# Patient Record
Sex: FEMALE | Race: WHITE | NOT HISPANIC OR LATINO
[De-identification: names, ages, dates, MRNs, and addresses within clinical notes are randomized per-mention and may not be internally consistent; named-entity substitution may affect disease eponyms.]

---

## 2017-10-29 ENCOUNTER — TRANSCRIPTION ENCOUNTER (OUTPATIENT)
Age: 82
End: 2017-10-29

## 2017-11-08 ENCOUNTER — TRANSCRIPTION ENCOUNTER (OUTPATIENT)
Age: 82
End: 2017-11-08

## 2019-07-08 ENCOUNTER — APPOINTMENT (OUTPATIENT)
Dept: CARDIOLOGY | Facility: CLINIC | Age: 84
End: 2019-07-08

## 2019-07-08 ENCOUNTER — OUTPATIENT (OUTPATIENT)
Dept: OUTPATIENT SERVICES | Facility: HOSPITAL | Age: 84
LOS: 1 days | End: 2019-07-08
Payer: MEDICARE

## 2019-07-08 DIAGNOSIS — Z90.89 ACQUIRED ABSENCE OF OTHER ORGANS: Chronic | ICD-10-CM

## 2019-07-08 DIAGNOSIS — R07.9 CHEST PAIN, UNSPECIFIED: ICD-10-CM

## 2019-07-08 PROCEDURE — 75571 CT HRT W/O DYE W/CA TEST: CPT | Mod: 26

## 2019-07-08 PROCEDURE — 75571 CT HRT W/O DYE W/CA TEST: CPT

## 2019-08-19 ENCOUNTER — APPOINTMENT (OUTPATIENT)
Dept: MRI IMAGING | Facility: IMAGING CENTER | Age: 84
End: 2019-08-19
Payer: MEDICARE

## 2019-08-19 ENCOUNTER — OUTPATIENT (OUTPATIENT)
Dept: OUTPATIENT SERVICES | Facility: HOSPITAL | Age: 84
LOS: 1 days | End: 2019-08-19
Payer: MEDICARE

## 2019-08-19 DIAGNOSIS — Z00.8 ENCOUNTER FOR OTHER GENERAL EXAMINATION: ICD-10-CM

## 2019-08-19 DIAGNOSIS — Z90.89 ACQUIRED ABSENCE OF OTHER ORGANS: Chronic | ICD-10-CM

## 2019-08-19 PROCEDURE — 70551 MRI BRAIN STEM W/O DYE: CPT | Mod: 26

## 2019-08-19 PROCEDURE — 70551 MRI BRAIN STEM W/O DYE: CPT

## 2023-12-06 ENCOUNTER — INPATIENT (INPATIENT)
Facility: HOSPITAL | Age: 88
LOS: 3 days | Discharge: NURSING, REHAB OR LONG-TERM CARE | DRG: 149 | End: 2023-12-10
Attending: STUDENT IN AN ORGANIZED HEALTH CARE EDUCATION/TRAINING PROGRAM | Admitting: HOSPITALIST
Payer: MEDICARE

## 2023-12-06 VITALS
RESPIRATION RATE: 17 BRPM | WEIGHT: 100.09 LBS | HEART RATE: 64 BPM | SYSTOLIC BLOOD PRESSURE: 121 MMHG | HEIGHT: 62 IN | TEMPERATURE: 98 F | OXYGEN SATURATION: 95 % | DIASTOLIC BLOOD PRESSURE: 58 MMHG

## 2023-12-06 DIAGNOSIS — S22.39XA FRACTURE OF ONE RIB, UNSPECIFIED SIDE, INITIAL ENCOUNTER FOR CLOSED FRACTURE: ICD-10-CM

## 2023-12-06 DIAGNOSIS — R42 DIZZINESS AND GIDDINESS: ICD-10-CM

## 2023-12-06 DIAGNOSIS — Z86.69 PERSONAL HISTORY OF OTHER DISEASES OF THE NERVOUS SYSTEM AND SENSE ORGANS: ICD-10-CM

## 2023-12-06 DIAGNOSIS — Z29.9 ENCOUNTER FOR PROPHYLACTIC MEASURES, UNSPECIFIED: ICD-10-CM

## 2023-12-06 DIAGNOSIS — Z90.89 ACQUIRED ABSENCE OF OTHER ORGANS: Chronic | ICD-10-CM

## 2023-12-06 LAB
ALBUMIN SERPL ELPH-MCNC: 4.2 G/DL — SIGNIFICANT CHANGE UP (ref 3.3–5)
ALBUMIN SERPL ELPH-MCNC: 4.2 G/DL — SIGNIFICANT CHANGE UP (ref 3.3–5)
ALP SERPL-CCNC: 80 U/L — SIGNIFICANT CHANGE UP (ref 40–120)
ALP SERPL-CCNC: 80 U/L — SIGNIFICANT CHANGE UP (ref 40–120)
ALT FLD-CCNC: 23 U/L — SIGNIFICANT CHANGE UP (ref 10–45)
ALT FLD-CCNC: 23 U/L — SIGNIFICANT CHANGE UP (ref 10–45)
ANION GAP SERPL CALC-SCNC: 11 MMOL/L — SIGNIFICANT CHANGE UP (ref 5–17)
ANION GAP SERPL CALC-SCNC: 11 MMOL/L — SIGNIFICANT CHANGE UP (ref 5–17)
AST SERPL-CCNC: 43 U/L — HIGH (ref 10–40)
AST SERPL-CCNC: 43 U/L — HIGH (ref 10–40)
BASOPHILS # BLD AUTO: 0 K/UL — SIGNIFICANT CHANGE UP (ref 0–0.2)
BASOPHILS # BLD AUTO: 0 K/UL — SIGNIFICANT CHANGE UP (ref 0–0.2)
BASOPHILS NFR BLD AUTO: 0 % — SIGNIFICANT CHANGE UP (ref 0–2)
BASOPHILS NFR BLD AUTO: 0 % — SIGNIFICANT CHANGE UP (ref 0–2)
BILIRUB SERPL-MCNC: 0.4 MG/DL — SIGNIFICANT CHANGE UP (ref 0.2–1.2)
BILIRUB SERPL-MCNC: 0.4 MG/DL — SIGNIFICANT CHANGE UP (ref 0.2–1.2)
BUN SERPL-MCNC: 23 MG/DL — SIGNIFICANT CHANGE UP (ref 7–23)
BUN SERPL-MCNC: 23 MG/DL — SIGNIFICANT CHANGE UP (ref 7–23)
CALCIUM SERPL-MCNC: 9.1 MG/DL — SIGNIFICANT CHANGE UP (ref 8.4–10.5)
CALCIUM SERPL-MCNC: 9.1 MG/DL — SIGNIFICANT CHANGE UP (ref 8.4–10.5)
CHLORIDE SERPL-SCNC: 95 MMOL/L — LOW (ref 96–108)
CHLORIDE SERPL-SCNC: 95 MMOL/L — LOW (ref 96–108)
CO2 SERPL-SCNC: 26 MMOL/L — SIGNIFICANT CHANGE UP (ref 22–31)
CO2 SERPL-SCNC: 26 MMOL/L — SIGNIFICANT CHANGE UP (ref 22–31)
CREAT SERPL-MCNC: 0.59 MG/DL — SIGNIFICANT CHANGE UP (ref 0.5–1.3)
CREAT SERPL-MCNC: 0.59 MG/DL — SIGNIFICANT CHANGE UP (ref 0.5–1.3)
EGFR: 85 ML/MIN/1.73M2 — SIGNIFICANT CHANGE UP
EGFR: 85 ML/MIN/1.73M2 — SIGNIFICANT CHANGE UP
EOSINOPHIL # BLD AUTO: 0 K/UL — SIGNIFICANT CHANGE UP (ref 0–0.5)
EOSINOPHIL # BLD AUTO: 0 K/UL — SIGNIFICANT CHANGE UP (ref 0–0.5)
EOSINOPHIL NFR BLD AUTO: 0 % — SIGNIFICANT CHANGE UP (ref 0–6)
EOSINOPHIL NFR BLD AUTO: 0 % — SIGNIFICANT CHANGE UP (ref 0–6)
GLUCOSE SERPL-MCNC: 100 MG/DL — HIGH (ref 70–99)
GLUCOSE SERPL-MCNC: 100 MG/DL — HIGH (ref 70–99)
HCT VFR BLD CALC: 35.7 % — SIGNIFICANT CHANGE UP (ref 34.5–45)
HCT VFR BLD CALC: 35.7 % — SIGNIFICANT CHANGE UP (ref 34.5–45)
HGB BLD-MCNC: 12.1 G/DL — SIGNIFICANT CHANGE UP (ref 11.5–15.5)
HGB BLD-MCNC: 12.1 G/DL — SIGNIFICANT CHANGE UP (ref 11.5–15.5)
LYMPHOCYTES # BLD AUTO: 0.69 K/UL — LOW (ref 1–3.3)
LYMPHOCYTES # BLD AUTO: 0.69 K/UL — LOW (ref 1–3.3)
LYMPHOCYTES # BLD AUTO: 10.7 % — LOW (ref 13–44)
LYMPHOCYTES # BLD AUTO: 10.7 % — LOW (ref 13–44)
MANUAL SMEAR VERIFICATION: SIGNIFICANT CHANGE UP
MANUAL SMEAR VERIFICATION: SIGNIFICANT CHANGE UP
MCHC RBC-ENTMCNC: 31.1 PG — SIGNIFICANT CHANGE UP (ref 27–34)
MCHC RBC-ENTMCNC: 31.1 PG — SIGNIFICANT CHANGE UP (ref 27–34)
MCHC RBC-ENTMCNC: 33.9 GM/DL — SIGNIFICANT CHANGE UP (ref 32–36)
MCHC RBC-ENTMCNC: 33.9 GM/DL — SIGNIFICANT CHANGE UP (ref 32–36)
MCV RBC AUTO: 91.8 FL — SIGNIFICANT CHANGE UP (ref 80–100)
MCV RBC AUTO: 91.8 FL — SIGNIFICANT CHANGE UP (ref 80–100)
MONOCYTES # BLD AUTO: 0.57 K/UL — SIGNIFICANT CHANGE UP (ref 0–0.9)
MONOCYTES # BLD AUTO: 0.57 K/UL — SIGNIFICANT CHANGE UP (ref 0–0.9)
MONOCYTES NFR BLD AUTO: 8.9 % — SIGNIFICANT CHANGE UP (ref 2–14)
MONOCYTES NFR BLD AUTO: 8.9 % — SIGNIFICANT CHANGE UP (ref 2–14)
NEUTROPHILS # BLD AUTO: 5.15 K/UL — SIGNIFICANT CHANGE UP (ref 1.8–7.4)
NEUTROPHILS # BLD AUTO: 5.15 K/UL — SIGNIFICANT CHANGE UP (ref 1.8–7.4)
NEUTROPHILS NFR BLD AUTO: 75.9 % — SIGNIFICANT CHANGE UP (ref 43–77)
NEUTROPHILS NFR BLD AUTO: 75.9 % — SIGNIFICANT CHANGE UP (ref 43–77)
NEUTS BAND # BLD: 4.5 % — SIGNIFICANT CHANGE UP (ref 0–8)
NEUTS BAND # BLD: 4.5 % — SIGNIFICANT CHANGE UP (ref 0–8)
PLAT MORPH BLD: NORMAL — SIGNIFICANT CHANGE UP
PLAT MORPH BLD: NORMAL — SIGNIFICANT CHANGE UP
PLATELET # BLD AUTO: 235 K/UL — SIGNIFICANT CHANGE UP (ref 150–400)
PLATELET # BLD AUTO: 235 K/UL — SIGNIFICANT CHANGE UP (ref 150–400)
POTASSIUM SERPL-MCNC: 4 MMOL/L — SIGNIFICANT CHANGE UP (ref 3.5–5.3)
POTASSIUM SERPL-MCNC: 4 MMOL/L — SIGNIFICANT CHANGE UP (ref 3.5–5.3)
POTASSIUM SERPL-SCNC: 4 MMOL/L — SIGNIFICANT CHANGE UP (ref 3.5–5.3)
POTASSIUM SERPL-SCNC: 4 MMOL/L — SIGNIFICANT CHANGE UP (ref 3.5–5.3)
PROT SERPL-MCNC: 6.6 G/DL — SIGNIFICANT CHANGE UP (ref 6–8.3)
PROT SERPL-MCNC: 6.6 G/DL — SIGNIFICANT CHANGE UP (ref 6–8.3)
RBC # BLD: 3.89 M/UL — SIGNIFICANT CHANGE UP (ref 3.8–5.2)
RBC # BLD: 3.89 M/UL — SIGNIFICANT CHANGE UP (ref 3.8–5.2)
RBC # FLD: 12.5 % — SIGNIFICANT CHANGE UP (ref 10.3–14.5)
RBC # FLD: 12.5 % — SIGNIFICANT CHANGE UP (ref 10.3–14.5)
RBC BLD AUTO: SIGNIFICANT CHANGE UP
RBC BLD AUTO: SIGNIFICANT CHANGE UP
SODIUM SERPL-SCNC: 132 MMOL/L — LOW (ref 135–145)
SODIUM SERPL-SCNC: 132 MMOL/L — LOW (ref 135–145)
WBC # BLD: 6.41 K/UL — SIGNIFICANT CHANGE UP (ref 3.8–10.5)
WBC # BLD: 6.41 K/UL — SIGNIFICANT CHANGE UP (ref 3.8–10.5)
WBC # FLD AUTO: 6.41 K/UL — SIGNIFICANT CHANGE UP (ref 3.8–10.5)
WBC # FLD AUTO: 6.41 K/UL — SIGNIFICANT CHANGE UP (ref 3.8–10.5)

## 2023-12-06 PROCEDURE — 99285 EMERGENCY DEPT VISIT HI MDM: CPT | Mod: FS

## 2023-12-06 PROCEDURE — 70486 CT MAXILLOFACIAL W/O DYE: CPT | Mod: 26,MA

## 2023-12-06 PROCEDURE — 71250 CT THORAX DX C-: CPT | Mod: 26,MA

## 2023-12-06 PROCEDURE — 74176 CT ABD & PELVIS W/O CONTRAST: CPT | Mod: 26,MA

## 2023-12-06 PROCEDURE — 73502 X-RAY EXAM HIP UNI 2-3 VIEWS: CPT | Mod: 26,RT

## 2023-12-06 PROCEDURE — 93010 ELECTROCARDIOGRAM REPORT: CPT

## 2023-12-06 PROCEDURE — 99223 1ST HOSP IP/OBS HIGH 75: CPT

## 2023-12-06 PROCEDURE — 70450 CT HEAD/BRAIN W/O DYE: CPT | Mod: 26,MA

## 2023-12-06 PROCEDURE — 72170 X-RAY EXAM OF PELVIS: CPT | Mod: 26,59

## 2023-12-06 PROCEDURE — 76376 3D RENDER W/INTRP POSTPROCES: CPT | Mod: 26

## 2023-12-06 PROCEDURE — 72125 CT NECK SPINE W/O DYE: CPT | Mod: 26,MA

## 2023-12-06 RX ORDER — CARBIDOPA AND LEVODOPA 25; 100 MG/1; MG/1
1 TABLET ORAL THREE TIMES A DAY
Refills: 0 | Status: DISCONTINUED | OUTPATIENT
Start: 2023-12-06 | End: 2023-12-10

## 2023-12-06 RX ORDER — MECLIZINE HCL 12.5 MG
12.5 TABLET ORAL THREE TIMES A DAY
Refills: 0 | Status: DISCONTINUED | OUTPATIENT
Start: 2023-12-06 | End: 2023-12-09

## 2023-12-06 RX ORDER — ACETAMINOPHEN 500 MG
975 TABLET ORAL ONCE
Refills: 0 | Status: COMPLETED | OUTPATIENT
Start: 2023-12-06 | End: 2023-12-06

## 2023-12-06 RX ORDER — LANOLIN ALCOHOL/MO/W.PET/CERES
3 CREAM (GRAM) TOPICAL AT BEDTIME
Refills: 0 | Status: DISCONTINUED | OUTPATIENT
Start: 2023-12-06 | End: 2023-12-10

## 2023-12-06 RX ORDER — ENOXAPARIN SODIUM 100 MG/ML
40 INJECTION SUBCUTANEOUS EVERY 24 HOURS
Refills: 0 | Status: DISCONTINUED | OUTPATIENT
Start: 2023-12-06 | End: 2023-12-10

## 2023-12-06 RX ORDER — OXYCODONE HYDROCHLORIDE 5 MG/1
5 TABLET ORAL ONCE
Refills: 0 | Status: DISCONTINUED | OUTPATIENT
Start: 2023-12-06 | End: 2023-12-06

## 2023-12-06 RX ORDER — TETANUS TOXOID, REDUCED DIPHTHERIA TOXOID AND ACELLULAR PERTUSSIS VACCINE, ADSORBED 5; 2.5; 8; 8; 2.5 [IU]/.5ML; [IU]/.5ML; UG/.5ML; UG/.5ML; UG/.5ML
0.5 SUSPENSION INTRAMUSCULAR ONCE
Refills: 0 | Status: COMPLETED | OUTPATIENT
Start: 2023-12-06 | End: 2023-12-06

## 2023-12-06 RX ORDER — ACETAMINOPHEN 500 MG
650 TABLET ORAL EVERY 6 HOURS
Refills: 0 | Status: DISCONTINUED | OUTPATIENT
Start: 2023-12-06 | End: 2023-12-10

## 2023-12-06 RX ORDER — ONDANSETRON 8 MG/1
4 TABLET, FILM COATED ORAL EVERY 8 HOURS
Refills: 0 | Status: DISCONTINUED | OUTPATIENT
Start: 2023-12-06 | End: 2023-12-10

## 2023-12-06 RX ADMIN — Medication 975 MILLIGRAM(S): at 12:05

## 2023-12-06 RX ADMIN — ENOXAPARIN SODIUM 40 MILLIGRAM(S): 100 INJECTION SUBCUTANEOUS at 22:47

## 2023-12-06 RX ADMIN — CARBIDOPA AND LEVODOPA 1 TABLET(S): 25; 100 TABLET ORAL at 22:47

## 2023-12-06 RX ADMIN — TETANUS TOXOID, REDUCED DIPHTHERIA TOXOID AND ACELLULAR PERTUSSIS VACCINE, ADSORBED 0.5 MILLILITER(S): 5; 2.5; 8; 8; 2.5 SUSPENSION INTRAMUSCULAR at 12:06

## 2023-12-06 RX ADMIN — Medication 12.5 MILLIGRAM(S): at 22:47

## 2023-12-06 NOTE — PATIENT PROFILE ADULT - FALL HARM RISK - HARM RISK INTERVENTIONS
Assistance with ambulation/Assistance OOB with selected safe patient handling equipment/Communicate Risk of Fall with Harm to all staff/Reinforce activity limits and safety measures with patient and family/Tailored Fall Risk Interventions/Visual Cue: Yellow wristband and red socks/Bed in lowest position, wheels locked, appropriate side rails in place/Call bell, personal items and telephone in reach/Instruct patient to call for assistance before getting out of bed or chair/Non-slip footwear when patient is out of bed/[place] to call system/Physically safe environment - no spills, clutter or unnecessary equipment/Purposeful Proactive Rounding/Room/bathroom lighting operational, light cord in reach Assistance with ambulation/Assistance OOB with selected safe patient handling equipment/Communicate Risk of Fall with Harm to all staff/Reinforce activity limits and safety measures with patient and family/Tailored Fall Risk Interventions/Visual Cue: Yellow wristband and red socks/Bed in lowest position, wheels locked, appropriate side rails in place/Call bell, personal items and telephone in reach/Instruct patient to call for assistance before getting out of bed or chair/Non-slip footwear when patient is out of bed/[place] to call system/Physically safe environment - no spills, clutter or unnecessary equipment/Purposeful Proactive Rounding/Room/bathroom lighting operational, light cord in reach

## 2023-12-06 NOTE — ED PROVIDER NOTE - PHYSICAL EXAMINATION
Attending note.  Patient is alert and only mild distress.  Patient has tenderness swelling and bruising of the left cheek and nose with deformity.  There is dried blood in the left nostril.  There is no septal hematoma.  Patient has tenderness and the right lower paracervical area midline.  Lungs are clear and equal bilaterally.  Patient has tenderness in the right lateral ribs without crepitance.  Heart is regular rate and rhythm.  Abdomen is soft, nontender nondistended.  Pelvis is stable and nontender.  Patient has pain in bilateral groin with attempted straight leg raise.  She has full range of motion in the upper extremity and both knees.  She has mild cogwheel rigidity.  Otherwise neurological examination is grossly intact.

## 2023-12-06 NOTE — H&P ADULT - NSHPREVIEWOFSYSTEMS_GEN_ALL_CORE
Review of Systems:   CONSTITUTIONAL: No fever, weight loss  EYES: No eye pain, visual disturbances, or discharge  ENMT:  No difficulty hearing, tinnitus, vertigo; No sinus or throat pain  RESPIRATORY: No SOB. No cough, wheezing, chills or hemoptysis  CARDIOVASCULAR: No chest pain, palpitations, dizziness, or leg swelling  GASTROINTESTINAL: No abdominal or epigastric pain. No nausea, vomiting, or hematemesis; No diarrhea or constipation. No melena or hematochezia.  GENITOURINARY: No dysuria, frequency, hematuria, or incontinence  NEUROLOGICAL: No headaches, memory loss, loss of strength, numbness, or tremors  SKIN: No itching, burning, rashes, or lesions   LYMPH NODES: No enlarged glands  ENDOCRINE: No heat or cold intolerance; No hair loss  MUSCULOSKELETAL: No joint pain or swelling  PSYCHIATRIC: No depression, anxiety, mood swings, or difficulty sleeping  HEME/LYMPH: No easy bruising, or bleeding gums

## 2023-12-06 NOTE — PATIENT PROFILE ADULT - LOCATION #2
Writer sent prescription to the desired pharmacy and confirmed with patient that this was completed, no call back needed.   
patient is on vacation, forgot her Latanoprost at home, she is asking if we can call in a one month supply to the local [facility] pharmacy on [address], she will have them transfer the RX to where she is at.  
Right heel

## 2023-12-06 NOTE — ED ADULT NURSE NOTE - OBJECTIVE STATEMENT
Patient BIBEMS c/o R calf pain s/p fall out of bed yesterday. Patient hit her head, denies LOC, has abrasion to nose. Patient ambulatory at baseline with cane. Patient A&Ox4. Denies sob, chest pain, headache, dizziness. No signs of acute distress at this time. Plan of care in progress. Patient BIBEMS c/o R calf pain s/p fall out of bed yesterday. Patient hit her head, denies LOC, has abrasion to nose and L cheek. Patient ambulatory at baseline but is c/o dizziness x 4 days. Patient also reports having fall 3 days ago. Patient A&Ox4. Denies sob, chest pain, headache. No signs of acute distress at this time. Plan of care in progress.

## 2023-12-06 NOTE — PATIENT PROFILE ADULT - FUNCTIONAL ASSESSMENT - BASIC MOBILITY 6.
2-calculated by average/Not able to assess (calculate score using [facility] averaging method)  2-calculated by average/Not able to assess (calculate score using [facility] averaging method)

## 2023-12-06 NOTE — H&P ADULT - NSHPPHYSICALEXAM_GEN_ALL_CORE
PHYSICAL EXAM:  Vital Signs Last 24 Hrs  T(C): 36.7 (06 Dec 2023 09:57), Max: 36.7 (06 Dec 2023 09:57)  T(F): 98 (06 Dec 2023 09:57), Max: 98 (06 Dec 2023 09:57)  HR: 64 (06 Dec 2023 09:57) (64 - 64)  BP: 121/58 (06 Dec 2023 09:57) (121/58 - 121/58)  BP(mean): --  RR: 17 (06 Dec 2023 09:57) (17 - 17)  SpO2: 95% (06 Dec 2023 09:57) (95% - 95%)      CONSTITUTIONAL: NAD, thin, L facial ecchymosis + swelling  EYES: PERRL; conjunctiva and sclera clear  ENMT: Moist oral mucosa  RESPIRATORY: Normal respiratory effort; lungs are clear to auscultation bilaterally  CARDIOVASCULAR: Regular rate and rhythm, normal S1 and S2; No lower extremity edema  ABDOMEN: Nontender to palpation, normoactive bowel sounds, no rebound/guarding  MUSCULOSKELETAL: no clubbing or cyanosis of digits; no joint swelling or tenderness to palpation  PSYCH: A+O to person, month. Thinks that she is at home and the year is 1990s; calm  NEUROLOGY: moving all extremities; no gross sensory deficits   SKIN: No rashes; no palpable lesions

## 2023-12-06 NOTE — ED PROVIDER NOTE - CLINICAL SUMMARY MEDICAL DECISION MAKING FREE TEXT BOX
Attending note.  See differential above.  Patient with Parkinson's who fell 4 days ago and again yesterday with clinically fracture of nose and tenderness and pain to left cheek, right lateral ribs, groin bilaterally.  Concern for nasal fracture, possible rib fracture, pelvic or hip fracture.  Trauma pan scan with CT, hip x-ray, labs, urinalysis, EKG.  Likely admission.

## 2023-12-06 NOTE — H&P ADULT - NSHPLABSRESULTS_GEN_ALL_CORE
LABS:                        12.1   6.41  )-----------( 235      ( 06 Dec 2023 12:06 )             35.7     12-06    132<L>  |  95<L>  |  23  ----------------------------<  100<H>  4.0   |  26  |  0.59    Ca    9.1      06 Dec 2023 12:06    TPro  6.6  /  Alb  4.2  /  TBili  0.4  /  DBili  x   /  AST  43<H>  /  ALT  23  /  AlkPhos  80  12-06          Urinalysis Basic - ( 06 Dec 2023 12:06 )    Color: x / Appearance: x / SG: x / pH: x  Gluc: 100 mg/dL / Ketone: x  / Bili: x / Urobili: x   Blood: x / Protein: x / Nitrite: x   Leuk Esterase: x / RBC: x / WBC x   Sq Epi: x / Non Sq Epi: x / Bacteria: x          RADIOLOGY & ADDITIONAL TESTS:  Results Reviewed:   Imaging Personally Reviewed:  Electrocardiogram Personally Reviewed: NSR, has left bundle (seen on 2016)    COORDINATION OF CARE:  Care Discussed with Consultants/Other Providers [Y/N]:  Prior or Outpatient Records Reviewed [Y/N]:

## 2023-12-06 NOTE — H&P ADULT - PROBLEM SELECTOR PLAN 1
CT chest with acute right lateral 10th rib fracture  Pain control with tylenol. Can add oxy 2.5mg PRN if needed    CTH, neck, maxillofacial with no fx, just left periorbital and premaxillary soft tissue swelling.

## 2023-12-06 NOTE — ED ADULT NURSE NOTE - NSFALLHARMRISKINTERV_ED_ALL_ED
Communicate risk of Fall with Harm to all staff, patient, and family/Provide visual cue: red socks, yellow wristband, yellow gown, etc/Reinforce activity limits and safety measures with patient and family/Bed in lowest position, wheels locked, appropriate side rails in place/Call bell, personal items and telephone in reach/Instruct patient to call for assistance before getting out of bed/chair/stretcher/Non-slip footwear applied when patient is off stretcher/[place] to call system/Physically safe environment - no spills, clutter or unnecessary equipment/Purposeful Proactive Rounding/Room/bathroom lighting operational, light cord in reach Communicate risk of Fall with Harm to all staff, patient, and family/Provide visual cue: red socks, yellow wristband, yellow gown, etc/Reinforce activity limits and safety measures with patient and family/Bed in lowest position, wheels locked, appropriate side rails in place/Call bell, personal items and telephone in reach/Instruct patient to call for assistance before getting out of bed/chair/stretcher/Non-slip footwear applied when patient is off stretcher/[place] to call system/Physically safe environment - no spills, clutter or unnecessary equipment/Purposeful Proactive Rounding/Room/bathroom lighting operational, light cord in reach

## 2023-12-06 NOTE — H&P ADULT - PROBLEM SELECTOR PLAN 2
On carbidopa-levodopa  Appears to be falling more frequently at home- says 2/2 dizziness  check orthostatics  Unlikely cardiac etiology for fall- LBBB present in 2016, no CP. No need for TTE at this time On carbidopa-levodopa  Appears to be falling more frequently at home- says 2/2 dizziness  check orthostatics  Unlikely cardiac etiology for fall- LBBB present in 2016, no CP. No need for TTE at this time  Patient currently oriented only to self + month. Unclear baseline, cannot reach . UA pending

## 2023-12-06 NOTE — ED PROVIDER NOTE - DIFFERENTIAL DIAGNOSIS
Differential not limited to nasal fracture, facial fracture, intracranial hemorrhage, cervical fracture, rib fracture, pelvic fracture Differential Diagnosis

## 2023-12-06 NOTE — H&P ADULT - ASSESSMENT
This is a 89 yo F [facility] Parkinson's and vertigo who presented to the ER for falls. Endorsing some difficulty ambulating due to dizziness.  This is a 89 yo F [facility] Parkinson's and vertigo who presented to the ER for falls. Endorsing some difficulty ambulating due to dizziness.

## 2023-12-06 NOTE — ED ADULT NURSE NOTE - BEFAST SPEECH SLURRED
AK    Caller: Oscar Whipple    Medication Name and Dosage:    rosuvastatin (CRESTOR) 40 MG tablet [[id number]]    metoprolol SR (TOPROL XL) 25 MG TABLET SR 24 HR [[id number]]      Medication amount left:  3    [facility]    Other questions (Topic): Requesting a 90 day supply    Callback Number (Will only call for issues): N/A    Thank you,   Alejandrina LOPEZ   
No

## 2023-12-06 NOTE — H&P ADULT - HISTORY OF PRESENT ILLNESS
This is a 89 yo F [facility] Parkinson's and vertigo who presented to the ER for falls. Reportedly fell 4 days ago, also fell yesterday AM and hit the left side of her face. Patient was having some R rib pain earlier but says it resolved, not endorsing any other pain. Lives with her .     Patient currently thinks she is at home, and says she is coming to the hospital tomorrow. Endorses living with her . Tried  x2, no answer for collateral.     In the ER, HD stable. Given tylenol + oxy. Imaging with no fractures.

## 2023-12-06 NOTE — ED ADULT NURSE REASSESSMENT NOTE - NS ED NURSE REASSESS COMMENT FT1
Received report from RAISSA Mejias RN. Patient admitted to medicine, pending bed at this time. AOx2, disoriented to time. Pt placed in position of comfort. Bed in lowest position, wheels locked, appropriate side rails raised.

## 2023-12-06 NOTE — ED PROVIDER NOTE - OBJECTIVE STATEMENT
Attending note.  Patient was seen in room #34 to the right.  Patient was brought in by EMS.  Patient has a history of Parkinson's and fell 4 days ago when she lost her balance.  Patient also reports falling yesterday morning off of the bed.  Patient struck the left side of her face and nose.  Had a brief nosebleed.  She also complaining of pain in the right groin as well as right lateral ribs.  She also reports some neck pain.  She has no numbness or paresthesia.  She denies any recent change in her health with no recent weight loss, or symptoms consistent with infection or ischemia.  She has a history of vertigo for which she takes meclizine and taking medication for her Parkinson's.  She never smoked.  She reports no allergies.  She reports not having no PCP at this time.  She lives alone at home with her elderly .

## 2023-12-06 NOTE — PATIENT PROFILE ADULT - NSPROGENPREVTRANSF_GEN_A_NUR
FYI. Patient  Had lab appointment  Today (01/04). Patients weight at time of appointment  Was 101.7lbs   no history of blood product transfusion

## 2023-12-07 LAB
ANION GAP SERPL CALC-SCNC: 10 MMOL/L — SIGNIFICANT CHANGE UP (ref 5–17)
ANION GAP SERPL CALC-SCNC: 10 MMOL/L — SIGNIFICANT CHANGE UP (ref 5–17)
APPEARANCE UR: ABNORMAL
APPEARANCE UR: ABNORMAL
BACTERIA # UR AUTO: NEGATIVE /HPF — SIGNIFICANT CHANGE UP
BACTERIA # UR AUTO: NEGATIVE /HPF — SIGNIFICANT CHANGE UP
BILIRUB UR-MCNC: NEGATIVE — SIGNIFICANT CHANGE UP
BILIRUB UR-MCNC: NEGATIVE — SIGNIFICANT CHANGE UP
BUN SERPL-MCNC: 29 MG/DL — HIGH (ref 7–23)
BUN SERPL-MCNC: 29 MG/DL — HIGH (ref 7–23)
CALCIUM SERPL-MCNC: 8.8 MG/DL — SIGNIFICANT CHANGE UP (ref 8.4–10.5)
CALCIUM SERPL-MCNC: 8.8 MG/DL — SIGNIFICANT CHANGE UP (ref 8.4–10.5)
CAST: 0 /LPF — SIGNIFICANT CHANGE UP (ref 0–4)
CAST: 0 /LPF — SIGNIFICANT CHANGE UP (ref 0–4)
CHLORIDE SERPL-SCNC: 97 MMOL/L — SIGNIFICANT CHANGE UP (ref 96–108)
CHLORIDE SERPL-SCNC: 97 MMOL/L — SIGNIFICANT CHANGE UP (ref 96–108)
CO2 SERPL-SCNC: 24 MMOL/L — SIGNIFICANT CHANGE UP (ref 22–31)
CO2 SERPL-SCNC: 24 MMOL/L — SIGNIFICANT CHANGE UP (ref 22–31)
COLOR SPEC: YELLOW — SIGNIFICANT CHANGE UP
COLOR SPEC: YELLOW — SIGNIFICANT CHANGE UP
CREAT SERPL-MCNC: 0.62 MG/DL — SIGNIFICANT CHANGE UP (ref 0.5–1.3)
CREAT SERPL-MCNC: 0.62 MG/DL — SIGNIFICANT CHANGE UP (ref 0.5–1.3)
DIFF PNL FLD: ABNORMAL
DIFF PNL FLD: ABNORMAL
EGFR: 84 ML/MIN/1.73M2 — SIGNIFICANT CHANGE UP
EGFR: 84 ML/MIN/1.73M2 — SIGNIFICANT CHANGE UP
GLUCOSE SERPL-MCNC: 82 MG/DL — SIGNIFICANT CHANGE UP (ref 70–99)
GLUCOSE SERPL-MCNC: 82 MG/DL — SIGNIFICANT CHANGE UP (ref 70–99)
GLUCOSE UR QL: NEGATIVE MG/DL — SIGNIFICANT CHANGE UP
GLUCOSE UR QL: NEGATIVE MG/DL — SIGNIFICANT CHANGE UP
KETONES UR-MCNC: 15 MG/DL
KETONES UR-MCNC: 15 MG/DL
LEUKOCYTE ESTERASE UR-ACNC: NEGATIVE — SIGNIFICANT CHANGE UP
LEUKOCYTE ESTERASE UR-ACNC: NEGATIVE — SIGNIFICANT CHANGE UP
NITRITE UR-MCNC: NEGATIVE — SIGNIFICANT CHANGE UP
NITRITE UR-MCNC: NEGATIVE — SIGNIFICANT CHANGE UP
PH UR: 6 — SIGNIFICANT CHANGE UP (ref 5–8)
PH UR: 6 — SIGNIFICANT CHANGE UP (ref 5–8)
POTASSIUM SERPL-MCNC: 3.6 MMOL/L — SIGNIFICANT CHANGE UP (ref 3.5–5.3)
POTASSIUM SERPL-MCNC: 3.6 MMOL/L — SIGNIFICANT CHANGE UP (ref 3.5–5.3)
POTASSIUM SERPL-SCNC: 3.6 MMOL/L — SIGNIFICANT CHANGE UP (ref 3.5–5.3)
POTASSIUM SERPL-SCNC: 3.6 MMOL/L — SIGNIFICANT CHANGE UP (ref 3.5–5.3)
PROT UR-MCNC: 30 MG/DL
PROT UR-MCNC: 30 MG/DL
RBC CASTS # UR COMP ASSIST: 15 /HPF — HIGH (ref 0–4)
RBC CASTS # UR COMP ASSIST: 15 /HPF — HIGH (ref 0–4)
REVIEW: SIGNIFICANT CHANGE UP
REVIEW: SIGNIFICANT CHANGE UP
SODIUM SERPL-SCNC: 131 MMOL/L — LOW (ref 135–145)
SODIUM SERPL-SCNC: 131 MMOL/L — LOW (ref 135–145)
SP GR SPEC: >1.03 — HIGH (ref 1–1.03)
SP GR SPEC: >1.03 — HIGH (ref 1–1.03)
SQUAMOUS # UR AUTO: 2 /HPF — SIGNIFICANT CHANGE UP (ref 0–5)
SQUAMOUS # UR AUTO: 2 /HPF — SIGNIFICANT CHANGE UP (ref 0–5)
UROBILINOGEN FLD QL: 1 MG/DL — SIGNIFICANT CHANGE UP (ref 0.2–1)
UROBILINOGEN FLD QL: 1 MG/DL — SIGNIFICANT CHANGE UP (ref 0.2–1)
WBC UR QL: 1 /HPF — SIGNIFICANT CHANGE UP (ref 0–5)
WBC UR QL: 1 /HPF — SIGNIFICANT CHANGE UP (ref 0–5)

## 2023-12-07 PROCEDURE — 99232 SBSQ HOSP IP/OBS MODERATE 35: CPT

## 2023-12-07 RX ADMIN — Medication 12.5 MILLIGRAM(S): at 21:50

## 2023-12-07 RX ADMIN — CARBIDOPA AND LEVODOPA 1 TABLET(S): 25; 100 TABLET ORAL at 21:50

## 2023-12-07 RX ADMIN — Medication 12.5 MILLIGRAM(S): at 05:07

## 2023-12-07 RX ADMIN — CARBIDOPA AND LEVODOPA 1 TABLET(S): 25; 100 TABLET ORAL at 05:07

## 2023-12-07 RX ADMIN — ENOXAPARIN SODIUM 40 MILLIGRAM(S): 100 INJECTION SUBCUTANEOUS at 21:50

## 2023-12-07 RX ADMIN — Medication 12.5 MILLIGRAM(S): at 15:04

## 2023-12-07 RX ADMIN — CARBIDOPA AND LEVODOPA 1 TABLET(S): 25; 100 TABLET ORAL at 15:04

## 2023-12-07 NOTE — PROGRESS NOTE ADULT - PROBLEM SELECTOR PLAN 2
On carbidopa-levodopa  Appears to be falling more frequently at home- says 2/2 dizziness  check orthostatics  Unlikely cardiac etiology for fall- LBBB present in 2016, no CP. No need for TTE at this time  Patient currently oriented only to self + month. Unclear baseline, cannot reach . UA without evidence of infection On carbidopa-levodopa  Appears to be falling more frequently at home- says 2/2 dizziness will check   orthostaics negataive  cardiac etiology for fall- LBBB present in 2016, no CP.  TTE to assess for valvular disease   Patient currently oriented only to self + month. Unclear baseline, cannot reach . UA without evidence of infection

## 2023-12-07 NOTE — PHYSICAL THERAPY INITIAL EVALUATION ADULT - ADDITIONAL COMMENTS
Pt reports she lives with her  in a duplex apt with 1 flight of stairs, +HR. Pt states her  is available to assist when needed.

## 2023-12-07 NOTE — PHYSICAL THERAPY INITIAL EVALUATION ADULT - PERTINENT HX OF CURRENT PROBLEM, REHAB EVAL
Pt is a 89 yo F [facility] Parkinson's and vertigo who presented to the ER for falls. Endorsing some difficulty ambulating due to dizziness. (-) R hip/pelvis x-ray 12/6/23. (-) CTH/C-spine CT 12/6/23. CT facial bones 12/6/23: Left periorbital and premaxillary soft tissue swelling. No acute fracture identified. +Chest CT 12/6/23: Acute right lateral 10th rib fracture.

## 2023-12-07 NOTE — PROGRESS NOTE ADULT - SUBJECTIVE AND OBJECTIVE BOX
Progress Note  12-06-23 (1d)  Patient is a 89y old  Female who presents with a chief complaint of s/p fall (06 Dec 2023 15:20)    Subjective / Interval 24 Events :  - No acute events overnight.  - Pt seen and examined at bedside this AM. States that she's been feeling weak and not having the strength to walk around for the past couple of weeks. Associated with dizziness, does not believe it's vertigo- denies any room spinning. With R sided rib pain, aware of fracture.     Additional ROS (if any): see above, otherwise negative     MEDICATIONS  (STANDING):  carbidopa/levodopa  10/100 1 Tablet(s) Oral three times a day  enoxaparin Injectable 40 milliGRAM(s) SubCutaneous every 24 hours  meclizine 12.5 milliGRAM(s) Oral three times a day    MEDICATIONS  (PRN):  acetaminophen     Tablet .. 650 milliGRAM(s) Oral every 6 hours PRN Temp greater or equal to 38C (100.4F), Mild Pain (1 - 3)  aluminum hydroxide/magnesium hydroxide/simethicone Suspension 30 milliLiter(s) Oral every 4 hours PRN Dyspepsia  melatonin 3 milliGRAM(s) Oral at bedtime PRN Insomnia  ondansetron Injectable 4 milliGRAM(s) IV Push every 8 hours PRN Nausea and/or Vomiting    CAPILLARY BLOOD GLUCOSE  I&O's Summary    06 Dec 2023 07:01  -  07 Dec 2023 07:00  --------------------------------------------------------  IN: 0 mL / OUT: 100 mL / NET: -100 mL      PHYSICAL EXAM:  Vital Signs Last 24 Hrs  T(C): 36.9 (07 Dec 2023 05:11), Max: 36.9 (07 Dec 2023 05:11)  T(F): 98.4 (07 Dec 2023 05:11), Max: 98.4 (07 Dec 2023 05:11)  HR: 67 (07 Dec 2023 09:29) (66 - 86)  BP: 136/71 (07 Dec 2023 09:29) (113/64 - 150/65)  BP(mean): --  RR: 18 (07 Dec 2023 05:11) (18 - 18)  SpO2: 97% (07 Dec 2023 09:29) (96% - 99%)    Parameters below as of 07 Dec 2023 09:29  Patient On (Oxygen Delivery Method): room air    General: NAD, non-toxic appearing   HEENT: EOMi, no scleral icterus, +L periorbital ecchymosis with swelling and ecchymosis over L cheek   CV: RRR, normal S1 and S2  Lungs: normal respiratory effort, CTAB  Abd: soft, nontender, nondistended  Ext: no edema, warm, well perfused  Pysch: AAOx2, appropriate affect   Neuro: grossly non-focal, moving all extremities spontaneously   Skin: no rashes or lesions     LABS:                          12.1   6.41  )-----------( 235      ( 06 Dec 2023 12:06 )             35.7       WBC Trend: 6.41<--  Hb Trend: 12.1<--    12-07    131<L>  |  97  |  29<H>  ----------------------------<  82  3.6   |  24  |  0.62    Ca    8.8      07 Dec 2023 07:15    TPro  6.6  /  Alb  4.2  /  TBili  0.4  /  DBili  x   /  AST  43<H>  /  ALT  23  /  AlkPhos  80  12-06      Urinalysis Basic - ( 07 Dec 2023 07:15 )    Color: x / Appearance: x / SG: x / pH: x  Gluc: 82 mg/dL / Ketone: x  / Bili: x / Urobili: x   Blood: x / Protein: x / Nitrite: x   Leuk Esterase: x / RBC: x / WBC x   Sq Epi: x / Non Sq Epi: x / Bacteria: x    RADIOLOGY & ADDITIONAL TESTS: Reviewed  - No new images

## 2023-12-07 NOTE — PROGRESS NOTE ADULT - ASSESSMENT
This is a 89 yo F [facility] Parkinson's and vertigo who presented to the ER for falls. Endorsing some difficulty ambulating due to dizziness.  This is a 89 yo F [facility] Parkinson's and vertigo who presented to the ER for falls. Endorsing some difficulty ambulating due to dizziness.

## 2023-12-07 NOTE — PROGRESS NOTE ADULT - PROBLEM SELECTOR PLAN 4
DVT ppx: lovenox SQ  Dispo: PT recommending CLAIRE DVT ppx: lovenox SQ  Dispo: PT recommending CLAIRE and will discuss vestibular thearpy eval

## 2023-12-08 LAB
ANION GAP SERPL CALC-SCNC: 6 MMOL/L — SIGNIFICANT CHANGE UP (ref 5–17)
ANION GAP SERPL CALC-SCNC: 6 MMOL/L — SIGNIFICANT CHANGE UP (ref 5–17)
BASOPHILS # BLD AUTO: 0.03 K/UL — SIGNIFICANT CHANGE UP (ref 0–0.2)
BASOPHILS # BLD AUTO: 0.03 K/UL — SIGNIFICANT CHANGE UP (ref 0–0.2)
BASOPHILS NFR BLD AUTO: 0.9 % — SIGNIFICANT CHANGE UP (ref 0–2)
BASOPHILS NFR BLD AUTO: 0.9 % — SIGNIFICANT CHANGE UP (ref 0–2)
BUN SERPL-MCNC: 26 MG/DL — HIGH (ref 7–23)
BUN SERPL-MCNC: 26 MG/DL — HIGH (ref 7–23)
CALCIUM SERPL-MCNC: 8.7 MG/DL — SIGNIFICANT CHANGE UP (ref 8.4–10.5)
CALCIUM SERPL-MCNC: 8.7 MG/DL — SIGNIFICANT CHANGE UP (ref 8.4–10.5)
CHLORIDE SERPL-SCNC: 99 MMOL/L — SIGNIFICANT CHANGE UP (ref 96–108)
CHLORIDE SERPL-SCNC: 99 MMOL/L — SIGNIFICANT CHANGE UP (ref 96–108)
CO2 SERPL-SCNC: 26 MMOL/L — SIGNIFICANT CHANGE UP (ref 22–31)
CO2 SERPL-SCNC: 26 MMOL/L — SIGNIFICANT CHANGE UP (ref 22–31)
CREAT SERPL-MCNC: 0.64 MG/DL — SIGNIFICANT CHANGE UP (ref 0.5–1.3)
CREAT SERPL-MCNC: 0.64 MG/DL — SIGNIFICANT CHANGE UP (ref 0.5–1.3)
EGFR: 83 ML/MIN/1.73M2 — SIGNIFICANT CHANGE UP
EGFR: 83 ML/MIN/1.73M2 — SIGNIFICANT CHANGE UP
EOSINOPHIL # BLD AUTO: 0 K/UL — SIGNIFICANT CHANGE UP (ref 0–0.5)
EOSINOPHIL # BLD AUTO: 0 K/UL — SIGNIFICANT CHANGE UP (ref 0–0.5)
EOSINOPHIL NFR BLD AUTO: 0 % — SIGNIFICANT CHANGE UP (ref 0–6)
EOSINOPHIL NFR BLD AUTO: 0 % — SIGNIFICANT CHANGE UP (ref 0–6)
FERRITIN SERPL-MCNC: 293 NG/ML — SIGNIFICANT CHANGE UP (ref 13–330)
FERRITIN SERPL-MCNC: 293 NG/ML — SIGNIFICANT CHANGE UP (ref 13–330)
FOLATE SERPL-MCNC: 18.6 NG/ML — SIGNIFICANT CHANGE UP
FOLATE SERPL-MCNC: 18.6 NG/ML — SIGNIFICANT CHANGE UP
GIANT PLATELETS BLD QL SMEAR: PRESENT — SIGNIFICANT CHANGE UP
GIANT PLATELETS BLD QL SMEAR: PRESENT — SIGNIFICANT CHANGE UP
GLUCOSE SERPL-MCNC: 83 MG/DL — SIGNIFICANT CHANGE UP (ref 70–99)
GLUCOSE SERPL-MCNC: 83 MG/DL — SIGNIFICANT CHANGE UP (ref 70–99)
HCT VFR BLD CALC: 36.2 % — SIGNIFICANT CHANGE UP (ref 34.5–45)
HCT VFR BLD CALC: 36.2 % — SIGNIFICANT CHANGE UP (ref 34.5–45)
HGB BLD-MCNC: 12.3 G/DL — SIGNIFICANT CHANGE UP (ref 11.5–15.5)
HGB BLD-MCNC: 12.3 G/DL — SIGNIFICANT CHANGE UP (ref 11.5–15.5)
IRON SATN MFR SERPL: 19 % — SIGNIFICANT CHANGE UP (ref 14–50)
IRON SATN MFR SERPL: 19 % — SIGNIFICANT CHANGE UP (ref 14–50)
IRON SATN MFR SERPL: 43 UG/DL — SIGNIFICANT CHANGE UP (ref 30–160)
IRON SATN MFR SERPL: 43 UG/DL — SIGNIFICANT CHANGE UP (ref 30–160)
LYMPHOCYTES # BLD AUTO: 1.18 K/UL — SIGNIFICANT CHANGE UP (ref 1–3.3)
LYMPHOCYTES # BLD AUTO: 1.18 K/UL — SIGNIFICANT CHANGE UP (ref 1–3.3)
LYMPHOCYTES # BLD AUTO: 35.1 % — SIGNIFICANT CHANGE UP (ref 13–44)
LYMPHOCYTES # BLD AUTO: 35.1 % — SIGNIFICANT CHANGE UP (ref 13–44)
MAGNESIUM SERPL-MCNC: 2.2 MG/DL — SIGNIFICANT CHANGE UP (ref 1.6–2.6)
MAGNESIUM SERPL-MCNC: 2.2 MG/DL — SIGNIFICANT CHANGE UP (ref 1.6–2.6)
MANUAL SMEAR VERIFICATION: SIGNIFICANT CHANGE UP
MANUAL SMEAR VERIFICATION: SIGNIFICANT CHANGE UP
MCHC RBC-ENTMCNC: 31.3 PG — SIGNIFICANT CHANGE UP (ref 27–34)
MCHC RBC-ENTMCNC: 31.3 PG — SIGNIFICANT CHANGE UP (ref 27–34)
MCHC RBC-ENTMCNC: 34 GM/DL — SIGNIFICANT CHANGE UP (ref 32–36)
MCHC RBC-ENTMCNC: 34 GM/DL — SIGNIFICANT CHANGE UP (ref 32–36)
MCV RBC AUTO: 92.1 FL — SIGNIFICANT CHANGE UP (ref 80–100)
MCV RBC AUTO: 92.1 FL — SIGNIFICANT CHANGE UP (ref 80–100)
MONOCYTES # BLD AUTO: 0.97 K/UL — HIGH (ref 0–0.9)
MONOCYTES # BLD AUTO: 0.97 K/UL — HIGH (ref 0–0.9)
MONOCYTES NFR BLD AUTO: 28.9 % — HIGH (ref 2–14)
MONOCYTES NFR BLD AUTO: 28.9 % — HIGH (ref 2–14)
NEUTROPHILS # BLD AUTO: 1.18 K/UL — LOW (ref 1.8–7.4)
NEUTROPHILS # BLD AUTO: 1.18 K/UL — LOW (ref 1.8–7.4)
NEUTROPHILS NFR BLD AUTO: 32.5 % — LOW (ref 43–77)
NEUTROPHILS NFR BLD AUTO: 32.5 % — LOW (ref 43–77)
NEUTS BAND # BLD: 2.6 % — SIGNIFICANT CHANGE UP (ref 0–8)
NEUTS BAND # BLD: 2.6 % — SIGNIFICANT CHANGE UP (ref 0–8)
PHOSPHATE SERPL-MCNC: 3.1 MG/DL — SIGNIFICANT CHANGE UP (ref 2.5–4.5)
PHOSPHATE SERPL-MCNC: 3.1 MG/DL — SIGNIFICANT CHANGE UP (ref 2.5–4.5)
PLAT MORPH BLD: NORMAL — SIGNIFICANT CHANGE UP
PLAT MORPH BLD: NORMAL — SIGNIFICANT CHANGE UP
PLATELET # BLD AUTO: 211 K/UL — SIGNIFICANT CHANGE UP (ref 150–400)
PLATELET # BLD AUTO: 211 K/UL — SIGNIFICANT CHANGE UP (ref 150–400)
POTASSIUM SERPL-MCNC: 3.4 MMOL/L — LOW (ref 3.5–5.3)
POTASSIUM SERPL-MCNC: 3.4 MMOL/L — LOW (ref 3.5–5.3)
POTASSIUM SERPL-SCNC: 3.4 MMOL/L — LOW (ref 3.5–5.3)
POTASSIUM SERPL-SCNC: 3.4 MMOL/L — LOW (ref 3.5–5.3)
RBC # BLD: 3.93 M/UL — SIGNIFICANT CHANGE UP (ref 3.8–5.2)
RBC # BLD: 3.93 M/UL — SIGNIFICANT CHANGE UP (ref 3.8–5.2)
RBC # FLD: 12.5 % — SIGNIFICANT CHANGE UP (ref 10.3–14.5)
RBC # FLD: 12.5 % — SIGNIFICANT CHANGE UP (ref 10.3–14.5)
RBC BLD AUTO: SIGNIFICANT CHANGE UP
RBC BLD AUTO: SIGNIFICANT CHANGE UP
SODIUM SERPL-SCNC: 131 MMOL/L — LOW (ref 135–145)
SODIUM SERPL-SCNC: 131 MMOL/L — LOW (ref 135–145)
TIBC SERPL-MCNC: 226 UG/DL — SIGNIFICANT CHANGE UP (ref 220–430)
TIBC SERPL-MCNC: 226 UG/DL — SIGNIFICANT CHANGE UP (ref 220–430)
UIBC SERPL-MCNC: 183 UG/DL — SIGNIFICANT CHANGE UP (ref 110–370)
UIBC SERPL-MCNC: 183 UG/DL — SIGNIFICANT CHANGE UP (ref 110–370)
VIT B12 SERPL-MCNC: >2000 PG/ML — HIGH (ref 232–1245)
VIT B12 SERPL-MCNC: >2000 PG/ML — HIGH (ref 232–1245)
WBC # BLD: 3.35 K/UL — LOW (ref 3.8–10.5)
WBC # BLD: 3.35 K/UL — LOW (ref 3.8–10.5)
WBC # FLD AUTO: 3.35 K/UL — LOW (ref 3.8–10.5)
WBC # FLD AUTO: 3.35 K/UL — LOW (ref 3.8–10.5)

## 2023-12-08 PROCEDURE — 99232 SBSQ HOSP IP/OBS MODERATE 35: CPT

## 2023-12-08 RX ORDER — POTASSIUM CHLORIDE 20 MEQ
20 PACKET (EA) ORAL ONCE
Refills: 0 | Status: COMPLETED | OUTPATIENT
Start: 2023-12-08 | End: 2023-12-08

## 2023-12-08 RX ORDER — SODIUM CHLORIDE 9 MG/ML
1000 INJECTION, SOLUTION INTRAVENOUS
Refills: 0 | Status: DISCONTINUED | OUTPATIENT
Start: 2023-12-08 | End: 2023-12-10

## 2023-12-08 RX ADMIN — SODIUM CHLORIDE 80 MILLILITER(S): 9 INJECTION, SOLUTION INTRAVENOUS at 11:39

## 2023-12-08 RX ADMIN — CARBIDOPA AND LEVODOPA 1 TABLET(S): 25; 100 TABLET ORAL at 14:36

## 2023-12-08 RX ADMIN — Medication 12.5 MILLIGRAM(S): at 06:43

## 2023-12-08 RX ADMIN — CARBIDOPA AND LEVODOPA 1 TABLET(S): 25; 100 TABLET ORAL at 06:43

## 2023-12-08 RX ADMIN — Medication 12.5 MILLIGRAM(S): at 14:35

## 2023-12-08 RX ADMIN — ENOXAPARIN SODIUM 40 MILLIGRAM(S): 100 INJECTION SUBCUTANEOUS at 22:37

## 2023-12-08 RX ADMIN — CARBIDOPA AND LEVODOPA 1 TABLET(S): 25; 100 TABLET ORAL at 21:43

## 2023-12-08 RX ADMIN — Medication 20 MILLIEQUIVALENT(S): at 19:48

## 2023-12-08 RX ADMIN — Medication 12.5 MILLIGRAM(S): at 21:43

## 2023-12-08 NOTE — PROGRESS NOTE ADULT - PROBLEM SELECTOR PLAN 3
Continue home meclizine but if not helping we can consider stopping this as well Describes less vertigo and more dizziness  orthostatics negative  meclizine not helping will DC  TTE  Will give trial of IVF

## 2023-12-08 NOTE — PROGRESS NOTE ADULT - PROBLEM SELECTOR PLAN 2
On carbidopa-levodopa  Appears to be falling more frequently at home- says 2/2 dizziness will check   orthostaics negataive  cardiac etiology for fall- LBBB present in 2016, no CP.  TTE to assess for valvular disease   Patient currently oriented only to self + month. Unclear baseline, cannot reach . UA without evidence of infection

## 2023-12-08 NOTE — PROGRESS NOTE ADULT - ASSESSMENT
This is a 89 yo F [facility] Parkinson's and vertigo who presented to the ER for falls. Endorsing some difficulty ambulating due to dizziness.  This is a 89 yo F [facility] Parkinson's and vertigo who presented to the ER for falls. Endorsing some difficulty ambulating due to dizziness.

## 2023-12-08 NOTE — PROGRESS NOTE ADULT - SUBJECTIVE AND OBJECTIVE BOX
PROGRESS NOTE:   Lu Harper, DO  Hospitalist  Teams  After 5pm/weekends or if no answer ext: [phone]      Patient is a 89y old  Female who presents with a chief complaint of s/p fall (07 Dec 2023 11:52)      SUBJECTIVE / OVERNIGHT EVENTS:  Dizziness improved.     ADDITIONAL REVIEW OF SYSTEMS:  No fever or chills no n/v/d    MEDICATIONS  (STANDING):  carbidopa/levodopa  10/100 1 Tablet(s) Oral three times a day  enoxaparin Injectable 40 milliGRAM(s) SubCutaneous every 24 hours  lactated ringers. 1000 milliLiter(s) (80 mL/Hr) IV Continuous <Continuous>  meclizine 12.5 milliGRAM(s) Oral three times a day    MEDICATIONS  (PRN):  acetaminophen     Tablet .. 650 milliGRAM(s) Oral every 6 hours PRN Temp greater or equal to 38C (100.4F), Mild Pain (1 - 3)  aluminum hydroxide/magnesium hydroxide/simethicone Suspension 30 milliLiter(s) Oral every 4 hours PRN Dyspepsia  melatonin 3 milliGRAM(s) Oral at bedtime PRN Insomnia  ondansetron Injectable 4 milliGRAM(s) IV Push every 8 hours PRN Nausea and/or Vomiting      CAPILLARY BLOOD GLUCOSE        I&O's Summary    07 Dec 2023 07:01  -  08 Dec 2023 07:00  --------------------------------------------------------  IN: 600 mL / OUT: 150 mL / NET: 450 mL        PHYSICAL EXAM:  Vital Signs Last 24 Hrs  T(C): 37.2 (08 Dec 2023 05:08), Max: 37.2 (08 Dec 2023 05:08)  T(F): 98.9 (08 Dec 2023 05:08), Max: 98.9 (08 Dec 2023 05:08)  HR: 62 (08 Dec 2023 05:08) (62 - 67)  BP: 130/61 (08 Dec 2023 05:08) (130/61 - 136/71)  BP(mean): --  RR: 18 (08 Dec 2023 05:08) (18 - 18)  SpO2: 93% (08 Dec 2023 05:08) (93% - 99%)    Parameters below as of 08 Dec 2023 05:08  Patient On (Oxygen Delivery Method): room air        CONSTITUTIONAL: NAD, well-developed  RESPIRATORY: Normal respiratory effort; lungs are clear to auscultation bilaterally  CARDIOVASCULAR: Regular rate and rhythm, normal S1 and S2, no murmur/rub/gallop; No lower extremity edema; Peripheral pulses are 2+ bilaterally  ABDOMEN: Nontender to palpation, normoactive bowel sounds, no rebound/guarding; No hepatosplenomegaly  MUSCLOSKELETAL: no clubbing or cyanosis of digits; no joint swelling or tenderness to palpation  PSYCH: A+O to person, place, and time; affect appropriate    LABS:                        12.3   3.35  )-----------( 211      ( 08 Dec 2023 06:58 )             36.2     12-08    131<L>  |  99  |  26<H>  ----------------------------<  83  3.4<L>   |  26  |  0.64    Ca    8.7      08 Dec 2023 06:59  Phos  3.1     12-08  Mg     2.2     12-08    TPro  6.6  /  Alb  4.2  /  TBili  0.4  /  DBili  x   /  AST  43<H>  /  ALT  23  /  AlkPhos  80  12-06          Urinalysis Basic - ( 08 Dec 2023 06:59 )    Color: x / Appearance: x / SG: x / pH: x  Gluc: 83 mg/dL / Ketone: x  / Bili: x / Urobili: x   Blood: x / Protein: x / Nitrite: x   Leuk Esterase: x / RBC: x / WBC x   Sq Epi: x / Non Sq Epi: x / Bacteria: x          RADIOLOGY & ADDITIONAL TESTS:  Results Reviewed:   Imaging Personally Reviewed:  Electrocardiogram Personally Reviewed:    COORDINATION OF CARE:  Care Discussed with Consultants/Other Providers [Y/N]:  Prior or Outpatient Records Reviewed [Y/N]:   PROGRESS NOTE:   Lu Harper, DO  Hospitalist  Teams  After 5pm/weekends or if no answer ext: [phone]      Patient is a 89y old  Female who presents with a chief complaint of s/p fall (07 Dec 2023 11:52)      SUBJECTIVE / OVERNIGHT EVENTS:  Dizziness improved.     ADDITIONAL REVIEW OF SYSTEMS:  No fever or chills no n/v/d    MEDICATIONS  (STANDING):  carbidopa/levodopa  10/100 1 Tablet(s) Oral three times a day  enoxaparin Injectable 40 milliGRAM(s) SubCutaneous every 24 hours  lactated ringers. 1000 milliLiter(s) (80 mL/Hr) IV Continuous <Continuous>  meclizine 12.5 milliGRAM(s) Oral three times a day    MEDICATIONS  (PRN):  acetaminophen     Tablet .. 650 milliGRAM(s) Oral every 6 hours PRN Temp greater or equal to 38C (100.4F), Mild Pain (1 - 3)  aluminum hydroxide/magnesium hydroxide/simethicone Suspension 30 milliLiter(s) Oral every 4 hours PRN Dyspepsia  melatonin 3 milliGRAM(s) Oral at bedtime PRN Insomnia  ondansetron Injectable 4 milliGRAM(s) IV Push every 8 hours PRN Nausea and/or Vomiting      CAPILLARY BLOOD GLUCOSE        I&O's Summary    07 Dec 2023 07:01  -  08 Dec 2023 07:00  --------------------------------------------------------  IN: 600 mL / OUT: 150 mL / NET: 450 mL        PHYSICAL EXAM:  Vital Signs Last 24 Hrs  T(C): 37.2 (08 Dec 2023 05:08), Max: 37.2 (08 Dec 2023 05:08)  T(F): 98.9 (08 Dec 2023 05:08), Max: 98.9 (08 Dec 2023 05:08)  HR: 62 (08 Dec 2023 05:08) (62 - 67)  BP: 130/61 (08 Dec 2023 05:08) (130/61 - 136/71)  BP(mean): --  RR: 18 (08 Dec 2023 05:08) (18 - 18)  SpO2: 93% (08 Dec 2023 05:08) (93% - 99%)    Parameters below as of 08 Dec 2023 05:08  Patient On (Oxygen Delivery Method): room air        CONSTITUTIONAL: NAD, well-developed  RESPIRATORY: Normal respiratory effort; lungs are clear to auscultation bilaterally  CARDIOVASCULAR: Regular rate and rhythm, normal S1 and S2, no murmur/rub/gallop; No lower extremity edema; Peripheral pulses are 2+ bilaterally  ABDOMEN: Nontender to palpation, normoactive bowel sounds, no rebound/guarding; No hepatosplenomegaly  MUSCLOSKELETAL: no clubbing or cyanosis of digits; no joint swelling or tenderness to palpation  PSYCH: A+O to person, place, and time; affect appropriate    LABS:                        12.3   3.35  )-----------( 211      ( 08 Dec 2023 06:58 )             36.2     12-08    131<L>  |  99  |  26<H>  ----------------------------<  83  3.4<L>   |  26  |  0.64    Ca    8.7      08 Dec 2023 06:59  Phos  3.1     12-08  Mg     2.2     12-08    TPro  6.6  /  Alb  4.2  /  TBili  0.4  /  DBili  x   /  AST  43<H>  /  ALT  23  /  AlkPhos  80  12-06          Urinalysis Basic - ( 08 Dec 2023 06:59 )    Color: x / Appearance: x / SG: x / pH: x  Gluc: 83 mg/dL / Ketone: x  / Bili: x / Urobili: x   Blood: x / Protein: x / Nitrite: x   Leuk Esterase: x / RBC: x / WBC x   Sq Epi: x / Non Sq Epi: x / Bacteria: x          RADIOLOGY & ADDITIONAL TESTS:  Results Reviewed:   Imaging Personally Reviewed:  Electrocardiogram Personally Reviewed:    COORDINATION OF CARE:  Care Discussed with Consultants/Other Providers [Y/N]:  Prior or Outpatient Records Reviewed [Y/N]:

## 2023-12-09 ENCOUNTER — TRANSCRIPTION ENCOUNTER (OUTPATIENT)
Age: 88
End: 2023-12-09

## 2023-12-09 LAB
ANION GAP SERPL CALC-SCNC: 9 MMOL/L — SIGNIFICANT CHANGE UP (ref 5–17)
ANION GAP SERPL CALC-SCNC: 9 MMOL/L — SIGNIFICANT CHANGE UP (ref 5–17)
BUN SERPL-MCNC: 17 MG/DL — SIGNIFICANT CHANGE UP (ref 7–23)
BUN SERPL-MCNC: 17 MG/DL — SIGNIFICANT CHANGE UP (ref 7–23)
CALCIUM SERPL-MCNC: 8.6 MG/DL — SIGNIFICANT CHANGE UP (ref 8.4–10.5)
CALCIUM SERPL-MCNC: 8.6 MG/DL — SIGNIFICANT CHANGE UP (ref 8.4–10.5)
CHLORIDE SERPL-SCNC: 100 MMOL/L — SIGNIFICANT CHANGE UP (ref 96–108)
CHLORIDE SERPL-SCNC: 100 MMOL/L — SIGNIFICANT CHANGE UP (ref 96–108)
CO2 SERPL-SCNC: 26 MMOL/L — SIGNIFICANT CHANGE UP (ref 22–31)
CO2 SERPL-SCNC: 26 MMOL/L — SIGNIFICANT CHANGE UP (ref 22–31)
CREAT SERPL-MCNC: 0.56 MG/DL — SIGNIFICANT CHANGE UP (ref 0.5–1.3)
CREAT SERPL-MCNC: 0.56 MG/DL — SIGNIFICANT CHANGE UP (ref 0.5–1.3)
EGFR: 86 ML/MIN/1.73M2 — SIGNIFICANT CHANGE UP
EGFR: 86 ML/MIN/1.73M2 — SIGNIFICANT CHANGE UP
GLUCOSE SERPL-MCNC: 83 MG/DL — SIGNIFICANT CHANGE UP (ref 70–99)
GLUCOSE SERPL-MCNC: 83 MG/DL — SIGNIFICANT CHANGE UP (ref 70–99)
HCT VFR BLD CALC: 35.1 % — SIGNIFICANT CHANGE UP (ref 34.5–45)
HCT VFR BLD CALC: 35.1 % — SIGNIFICANT CHANGE UP (ref 34.5–45)
HGB BLD-MCNC: 12 G/DL — SIGNIFICANT CHANGE UP (ref 11.5–15.5)
HGB BLD-MCNC: 12 G/DL — SIGNIFICANT CHANGE UP (ref 11.5–15.5)
MAGNESIUM SERPL-MCNC: 2 MG/DL — SIGNIFICANT CHANGE UP (ref 1.6–2.6)
MAGNESIUM SERPL-MCNC: 2 MG/DL — SIGNIFICANT CHANGE UP (ref 1.6–2.6)
MCHC RBC-ENTMCNC: 31.1 PG — SIGNIFICANT CHANGE UP (ref 27–34)
MCHC RBC-ENTMCNC: 31.1 PG — SIGNIFICANT CHANGE UP (ref 27–34)
MCHC RBC-ENTMCNC: 34.2 GM/DL — SIGNIFICANT CHANGE UP (ref 32–36)
MCHC RBC-ENTMCNC: 34.2 GM/DL — SIGNIFICANT CHANGE UP (ref 32–36)
MCV RBC AUTO: 90.9 FL — SIGNIFICANT CHANGE UP (ref 80–100)
MCV RBC AUTO: 90.9 FL — SIGNIFICANT CHANGE UP (ref 80–100)
NRBC # BLD: 0 /100 WBCS — SIGNIFICANT CHANGE UP (ref 0–0)
NRBC # BLD: 0 /100 WBCS — SIGNIFICANT CHANGE UP (ref 0–0)
PHOSPHATE SERPL-MCNC: 2.7 MG/DL — SIGNIFICANT CHANGE UP (ref 2.5–4.5)
PHOSPHATE SERPL-MCNC: 2.7 MG/DL — SIGNIFICANT CHANGE UP (ref 2.5–4.5)
PLATELET # BLD AUTO: 211 K/UL — SIGNIFICANT CHANGE UP (ref 150–400)
PLATELET # BLD AUTO: 211 K/UL — SIGNIFICANT CHANGE UP (ref 150–400)
POTASSIUM SERPL-MCNC: 3.8 MMOL/L — SIGNIFICANT CHANGE UP (ref 3.5–5.3)
POTASSIUM SERPL-MCNC: 3.8 MMOL/L — SIGNIFICANT CHANGE UP (ref 3.5–5.3)
POTASSIUM SERPL-SCNC: 3.8 MMOL/L — SIGNIFICANT CHANGE UP (ref 3.5–5.3)
POTASSIUM SERPL-SCNC: 3.8 MMOL/L — SIGNIFICANT CHANGE UP (ref 3.5–5.3)
RBC # BLD: 3.86 M/UL — SIGNIFICANT CHANGE UP (ref 3.8–5.2)
RBC # BLD: 3.86 M/UL — SIGNIFICANT CHANGE UP (ref 3.8–5.2)
RBC # FLD: 12.6 % — SIGNIFICANT CHANGE UP (ref 10.3–14.5)
RBC # FLD: 12.6 % — SIGNIFICANT CHANGE UP (ref 10.3–14.5)
SODIUM SERPL-SCNC: 135 MMOL/L — SIGNIFICANT CHANGE UP (ref 135–145)
SODIUM SERPL-SCNC: 135 MMOL/L — SIGNIFICANT CHANGE UP (ref 135–145)
WBC # BLD: 3.67 K/UL — LOW (ref 3.8–10.5)
WBC # BLD: 3.67 K/UL — LOW (ref 3.8–10.5)
WBC # FLD AUTO: 3.67 K/UL — LOW (ref 3.8–10.5)
WBC # FLD AUTO: 3.67 K/UL — LOW (ref 3.8–10.5)

## 2023-12-09 PROCEDURE — 93306 TTE W/DOPPLER COMPLETE: CPT | Mod: 26

## 2023-12-09 PROCEDURE — 99232 SBSQ HOSP IP/OBS MODERATE 35: CPT

## 2023-12-09 RX ADMIN — CARBIDOPA AND LEVODOPA 1 TABLET(S): 25; 100 TABLET ORAL at 15:59

## 2023-12-09 RX ADMIN — ENOXAPARIN SODIUM 40 MILLIGRAM(S): 100 INJECTION SUBCUTANEOUS at 21:35

## 2023-12-09 RX ADMIN — Medication 12.5 MILLIGRAM(S): at 06:05

## 2023-12-09 RX ADMIN — CARBIDOPA AND LEVODOPA 1 TABLET(S): 25; 100 TABLET ORAL at 06:05

## 2023-12-09 RX ADMIN — CARBIDOPA AND LEVODOPA 1 TABLET(S): 25; 100 TABLET ORAL at 21:34

## 2023-12-09 NOTE — PROGRESS NOTE ADULT - SUBJECTIVE AND OBJECTIVE BOX
[facility] Division of Hospital Medicine  Will Gold MD  Available via MS Teams    PROGRESS NOTE:     Patient is a 89y old  Female who presents with a chief complaint of s/p fall (09 Dec 2023 08:07)      SUBJECTIVE / OVERNIGHT EVENTS:  No acute events overnight. Patient seen and evaluated at bedside. No fever/chills.  Denies SOB at rest, chest pain, palpitations, abdominal pain, nausea/vomiting    MEDICATIONS  (STANDING):  carbidopa/levodopa  10/100 1 Tablet(s) Oral three times a day  enoxaparin Injectable 40 milliGRAM(s) SubCutaneous every 24 hours  lactated ringers. 1000 milliLiter(s) (80 mL/Hr) IV Continuous <Continuous>    MEDICATIONS  (PRN):  acetaminophen     Tablet .. 650 milliGRAM(s) Oral every 6 hours PRN Temp greater or equal to 38C (100.4F), Mild Pain (1 - 3)  aluminum hydroxide/magnesium hydroxide/simethicone Suspension 30 milliLiter(s) Oral every 4 hours PRN Dyspepsia  melatonin 3 milliGRAM(s) Oral at bedtime PRN Insomnia  ondansetron Injectable 4 milliGRAM(s) IV Push every 8 hours PRN Nausea and/or Vomiting      CAPILLARY BLOOD GLUCOSE        I&O's Summary    08 Dec 2023 07:01  -  09 Dec 2023 07:00  --------------------------------------------------------  IN: 500 mL / OUT: 1400 mL / NET: -900 mL        PHYSICAL EXAM:  Vital Signs Last 24 Hrs  T(C): 36.6 (09 Dec 2023 04:23), Max: 37.1 (08 Dec 2023 14:10)  T(F): 97.8 (09 Dec 2023 04:23), Max: 98.8 (08 Dec 2023 14:10)  HR: 61 (09 Dec 2023 04:23) (56 - 65)  BP: 122/65 (09 Dec 2023 04:23) (112/62 - 126/56)  BP(mean): --  RR: 18 (09 Dec 2023 04:23) (18 - 18)  SpO2: 96% (09 Dec 2023 04:23) (94% - 96%)    Parameters below as of 09 Dec 2023 04:23  Patient On (Oxygen Delivery Method): room air        CONSTITUTIONAL: NAD  RESPIRATORY: Normal respiratory effort; lungs are clear to auscultation bilaterally  CARDIOVASCULAR: Regular rate and rhythm, normal S1/S2, no murmurs. No lower extremity edema, No JVD   ABDOMEN: Nontender to palpation, normoactive bowel sounds, no rebound/guarding; No hepatosplenomegaly  MUSCLOSKELETAL: no clubbing or cyanosis of digits; no joint swelling or tenderness to palpation  NEURO: No focal deficits, upper/lower motor strength grossly intact bilaterally   EXT: Warm, well perfused   PSYCH: A+O to person, place, and time; affect appropriate    LABS:                        12.0   3.67  )-----------( 211      ( 09 Dec 2023 07:03 )             35.1     12-09    135  |  100  |  17  ----------------------------<  83  3.8   |  26  |  0.56    Ca    8.6      09 Dec 2023 07:02  Phos  2.7     12-09  Mg     2.0     12-09            Urinalysis Basic - ( 09 Dec 2023 07:02 )    Color: x / Appearance: x / SG: x / pH: x  Gluc: 83 mg/dL / Ketone: x  / Bili: x / Urobili: x   Blood: x / Protein: x / Nitrite: x   Leuk Esterase: x / RBC: x / WBC x   Sq Epi: x / Non Sq Epi: x / Bacteria: x         [facility] Division of Hospital Medicine  Will Gold MD  Available via MS Teams    PROGRESS NOTE:     Patient is a 89y old  Female who presents with a chief complaint of s/p fall (09 Dec 2023 08:07)      SUBJECTIVE / OVERNIGHT EVENTS:  No acute events overnight. Patient seen and evaluated at bedside. No fever/chills.  Denies SOB at rest, chest pain, palpitations, abdominal pain, nausea/vomiting    MEDICATIONS  (STANDING):  carbidopa/levodopa  10/100 1 Tablet(s) Oral three times a day  enoxaparin Injectable 40 milliGRAM(s) SubCutaneous every 24 hours  lactated ringers. 1000 milliLiter(s) (80 mL/Hr) IV Continuous <Continuous>    MEDICATIONS  (PRN):  acetaminophen     Tablet .. 650 milliGRAM(s) Oral every 6 hours PRN Temp greater or equal to 38C (100.4F), Mild Pain (1 - 3)  aluminum hydroxide/magnesium hydroxide/simethicone Suspension 30 milliLiter(s) Oral every 4 hours PRN Dyspepsia  melatonin 3 milliGRAM(s) Oral at bedtime PRN Insomnia  ondansetron Injectable 4 milliGRAM(s) IV Push every 8 hours PRN Nausea and/or Vomiting      CAPILLARY BLOOD GLUCOSE        I&O's Summary    08 Dec 2023 07:01  -  09 Dec 2023 07:00  --------------------------------------------------------  IN: 500 mL / OUT: 1400 mL / NET: -900 mL        PHYSICAL EXAM:  Vital Signs Last 24 Hrs  T(C): 36.6 (09 Dec 2023 04:23), Max: 37.1 (08 Dec 2023 14:10)  T(F): 97.8 (09 Dec 2023 04:23), Max: 98.8 (08 Dec 2023 14:10)  HR: 61 (09 Dec 2023 04:23) (56 - 65)  BP: 122/65 (09 Dec 2023 04:23) (112/62 - 126/56)  BP(mean): --  RR: 18 (09 Dec 2023 04:23) (18 - 18)  SpO2: 96% (09 Dec 2023 04:23) (94% - 96%)    Parameters below as of 09 Dec 2023 04:23  Patient On (Oxygen Delivery Method): room air        CONSTITUTIONAL: NAD  RESPIRATORY: Normal respiratory effort; lungs are clear to auscultation bilaterally  CARDIOVASCULAR: Regular rate and rhythm, normal S1/S2, no murmurs. No lower extremity edema, No JVD   ABDOMEN: Nontender to palpation, normoactive bowel sounds, no rebound/guarding; No hepatosplenomegaly  MUSCLOSKELETAL: no clubbing or cyanosis of digits; no joint swelling or tenderness to palpation  NEURO: No focal deficits, upper/lower motor strength grossly intact bilaterally   EXT: Warm, well perfused   PSYCH: A+O to person, place, and time; affect appropriate    LABS:                        12.0   3.67  )-----------( 211      ( 09 Dec 2023 07:03 )             35.1     12-09    135  |  100  |  17  ----------------------------<  83  3.8   |  26  |  0.56    Ca    8.6      09 Dec 2023 07:02  Phos  2.7     12-09  Mg     2.0     12-09            Urinalysis Basic - ( 09 Dec 2023 07:02 )    Color: x / Appearance: x / SG: x / pH: x  Gluc: 83 mg/dL / Ketone: x  / Bili: x / Urobili: x   Blood: x / Protein: x / Nitrite: x   Leuk Esterase: x / RBC: x / WBC x   Sq Epi: x / Non Sq Epi: x / Bacteria: x         [facility] Division of Hospital Medicine  Will Gold MD  Available via MS Teams    PROGRESS NOTE:     Patient is a 89y old  Female who presents with a chief complaint of s/p fall (09 Dec 2023 08:07)      SUBJECTIVE / OVERNIGHT EVENTS:  No acute events overnight. Patient seen and evaluated at bedside.   Reporting fatigue     MEDICATIONS  (STANDING):  carbidopa/levodopa  10/100 1 Tablet(s) Oral three times a day  enoxaparin Injectable 40 milliGRAM(s) SubCutaneous every 24 hours  lactated ringers. 1000 milliLiter(s) (80 mL/Hr) IV Continuous <Continuous>    MEDICATIONS  (PRN):  acetaminophen     Tablet .. 650 milliGRAM(s) Oral every 6 hours PRN Temp greater or equal to 38C (100.4F), Mild Pain (1 - 3)  aluminum hydroxide/magnesium hydroxide/simethicone Suspension 30 milliLiter(s) Oral every 4 hours PRN Dyspepsia  melatonin 3 milliGRAM(s) Oral at bedtime PRN Insomnia  ondansetron Injectable 4 milliGRAM(s) IV Push every 8 hours PRN Nausea and/or Vomiting      CAPILLARY BLOOD GLUCOSE        I&O's Summary    08 Dec 2023 07:01  -  09 Dec 2023 07:00  --------------------------------------------------------  IN: 500 mL / OUT: 1400 mL / NET: -900 mL        PHYSICAL EXAM:  Vital Signs Last 24 Hrs  T(C): 36.6 (09 Dec 2023 04:23), Max: 37.1 (08 Dec 2023 14:10)  T(F): 97.8 (09 Dec 2023 04:23), Max: 98.8 (08 Dec 2023 14:10)  HR: 61 (09 Dec 2023 04:23) (56 - 65)  BP: 122/65 (09 Dec 2023 04:23) (112/62 - 126/56)  BP(mean): --  RR: 18 (09 Dec 2023 04:23) (18 - 18)  SpO2: 96% (09 Dec 2023 04:23) (94% - 96%)    Parameters below as of 09 Dec 2023 04:23  Patient On (Oxygen Delivery Method): room air        CONSTITUTIONAL: NAD  RESPIRATORY: Normal respiratory effort; lungs are clear to auscultation bilaterally  CARDIOVASCULAR: Regular rate and rhythm, normal S1/S2, no murmurs. No lower extremity edema, No JVD   ABDOMEN: Nontender to palpation, normoactive bowel sounds, no rebound/guarding; No hepatosplenomegaly  MUSCLOSKELETAL: no clubbing or cyanosis of digits; no joint swelling or tenderness to palpation  NEURO: No focal deficits, upper/lower motor strength grossly intact bilaterally   EXT: Warm, well perfused   PSYCH: A+O to person, place, and time; affect appropriate    LABS:                        12.0   3.67  )-----------( 211      ( 09 Dec 2023 07:03 )             35.1     12-09    135  |  100  |  17  ----------------------------<  83  3.8   |  26  |  0.56    Ca    8.6      09 Dec 2023 07:02  Phos  2.7     12-09  Mg     2.0     12-09            Urinalysis Basic - ( 09 Dec 2023 07:02 )    Color: x / Appearance: x / SG: x / pH: x  Gluc: 83 mg/dL / Ketone: x  / Bili: x / Urobili: x   Blood: x / Protein: x / Nitrite: x   Leuk Esterase: x / RBC: x / WBC x   Sq Epi: x / Non Sq Epi: x / Bacteria: x         [facility] Division of Hospital Medicine  Will Gold MD  Available via MS Teams    PROGRESS NOTE:     Patient is a 89y old  Female who presents with a chief complaint of s/p fall (09 Dec 2023 08:07)      SUBJECTIVE / OVERNIGHT EVENTS:  No acute events overnight. Patient seen and evaluated at bedside.   Reporting fatigue     MEDICATIONS  (STANDING):  carbidopa/levodopa  10/100 1 Tablet(s) Oral three times a day  enoxaparin Injectable 40 milliGRAM(s) SubCutaneous every 24 hours  lactated ringers. 1000 milliLiter(s) (80 mL/Hr) IV Continuous <Continuous>    MEDICATIONS  (PRN):  acetaminophen     Tablet .. 650 milliGRAM(s) Oral every 6 hours PRN Temp greater or equal to 38C (100.4F), Mild Pain (1 - 3)  aluminum hydroxide/magnesium hydroxide/simethicone Suspension 30 milliLiter(s) Oral every 4 hours PRN Dyspepsia  melatonin 3 milliGRAM(s) Oral at bedtime PRN Insomnia  ondansetron Injectable 4 milliGRAM(s) IV Push every 8 hours PRN Nausea and/or Vomiting      CAPILLARY BLOOD GLUCOSE        I&O's Summary    08 Dec 2023 07:01  -  09 Dec 2023 07:00  --------------------------------------------------------  IN: 500 mL / OUT: 1400 mL / NET: -900 mL        PHYSICAL EXAM:  Vital Signs Last 24 Hrs  T(C): 36.6 (09 Dec 2023 04:23), Max: 37.1 (08 Dec 2023 14:10)  T(F): 97.8 (09 Dec 2023 04:23), Max: 98.8 (08 Dec 2023 14:10)  HR: 61 (09 Dec 2023 04:23) (56 - 65)  BP: 122/65 (09 Dec 2023 04:23) (112/62 - 126/56)  BP(mean): --  RR: 18 (09 Dec 2023 04:23) (18 - 18)  SpO2: 96% (09 Dec 2023 04:23) (94% - 96%)    Parameters below as of 09 Dec 2023 04:23  Patient On (Oxygen Delivery Method): room air        CONSTITUTIONAL: NAD  RESPIRATORY: Normal respiratory effort; lungs are clear to auscultation bilaterally  CARDIOVASCULAR: Regular rate and rhythm, normal S1/S2, no murmurs. No lower extremity edema, No JVD   ABDOMEN: Nontender to palpation, normoactive bowel sounds, no rebound/guarding; No hepatosplenomegaly  MUSCLOSKELETAL: no clubbing or cyanosis of digits; no joint swelling or tenderness to palpation  NEURO: No focal deficits, upper/lower motor strength grossly intact bilaterally   EXT: Warm, well perfused   PSYCH: A+O to person, place, and time; affect appropriate    LABS:                        12.0   3.67  )-----------( 211      ( 09 Dec 2023 07:03 )             35.1     12-09    135  |  100  |  17  ----------------------------<  83  3.8   |  26  |  0.56    Ca    8.6      09 Dec 2023 07:02  Phos  2.7     12-09  Mg     2.0     12-09            Urinalysis Basic - ( 09 Dec 2023 07:02 )    Color: x / Appearance: x / SG: x / pH: x  Gluc: 83 mg/dL / Ketone: x  / Bili: x / Urobili: x   Blood: x / Protein: x / Nitrite: x   Leuk Esterase: x / RBC: x / WBC x   Sq Epi: x / Non Sq Epi: x / Bacteria: x

## 2023-12-09 NOTE — DISCHARGE NOTE PROVIDER - CARE PROVIDER_API CALL
medicine clinic,   Phone: (   )    -  Fax: (   )    -  Follow Up Time:    medicine clinic,   Phone: (   )    -  Fax: (   )    -  Follow Up Time:     Alisa Finney  Internal Medicine  [address] [address]  Phone: [phone]  Fax: [phone]  Follow Up Time: 2 weeks   medicine clinic,   Phone: (   )    -  Fax: (   )    -  Follow Up Time:     Alisa Finney  Internal Medicine  [address] [address]  Phone: [phone]  Fax: [phone]  Follow Up Time: 2 weeks

## 2023-12-09 NOTE — PROGRESS NOTE ADULT - ASSESSMENT
This is a 89 yo F [facility] Parkinson's and vertigo who presented to the ER for falls. Endorsing some difficulty ambulating due to dizziness.  This is a 89 yo F [facility] Parkinson's and vertigo who presented to the ER for falls. Endorsing some difficulty ambulating due to dizziness.

## 2023-12-09 NOTE — DISCHARGE NOTE PROVIDER - NSDCCPCAREPLAN_GEN_ALL_CORE_FT
PRINCIPAL DISCHARGE DIAGNOSIS  Diagnosis: Fall  Assessment and Plan of Treatment: Fall prescautions  Physical therapy      SECONDARY DISCHARGE DIAGNOSES  Diagnosis: Rib fracture  Assessment and Plan of Treatment: CT chest with acute right lateral 10th rib fracture  Pain control with tylenol PRN    Diagnosis: Vertigo  Assessment and Plan of Treatment: Received IV fluid     PRINCIPAL DISCHARGE DIAGNOSIS  Diagnosis: Fall  Assessment and Plan of Treatment: Fall prescautions  Physical therapy to get stronger, please go to Vestibular therapy after rehab      SECONDARY DISCHARGE DIAGNOSES  Diagnosis: Rib fracture  Assessment and Plan of Treatment: CT chest with acute right lateral 10th rib fracture  Pain control with tylenol PRN    Diagnosis: Vertigo  Assessment and Plan of Treatment: Received IV fluid, no improvement with meclizine, follow up with outpatient provider. Can try vestibular therapy

## 2023-12-09 NOTE — DISCHARGE NOTE PROVIDER - NSDCMRMEDTOKEN_GEN_ALL_CORE_FT
carbidopa-levodopa 10 mg-100 mg oral tablet: 1 tab(s) orally 3 times a day  meclizine 12.5 mg oral tablet: 1 tab(s) orally 3 times a day   acetaminophen 325 mg oral tablet: 2 tab(s) orally every 6 hours As needed Temp greater or equal to 38C (100.4F), Mild Pain (1 - 3)  carbidopa-levodopa 10 mg-100 mg oral tablet: 1 tab(s) orally 3 times a day  melatonin 3 mg oral tablet: 1 tab(s) orally once a day (at bedtime) As needed Insomnia

## 2023-12-09 NOTE — DISCHARGE NOTE PROVIDER - PROVIDER TOKENS
FREE:[LAST:[medicine clinic],PHONE:[(   )    -],FAX:[(   )    -]] FREE:[LAST:[medicine clinic],PHONE:[(   )    -],FAX:[(   )    -]],PROVIDER:[TOKEN:[[id number]],FOLLOWUP:[2 weeks]] FREE:[LAST:[medicine clinic],PHONE:[(   )    -],FAX:[(   )    -]],PROVIDER:[TOKEN:[[id number]],FOLLOWUP:[2 weeks]]

## 2023-12-09 NOTE — DISCHARGE NOTE PROVIDER - NSDCFUADDAPPT_GEN_ALL_CORE_FT
Follow up with PMD after rehab  Follow up with PMD after rehab   Pt will need vestibular rehab after discharge from [facility]

## 2023-12-09 NOTE — DISCHARGE NOTE PROVIDER - CARE PROVIDERS DIRECT ADDRESSES
,DirectAddress_Unknown ,DirectAddress_Unknown,[email or web address] ,DirectAddress_Unknown,[email or web address]

## 2023-12-09 NOTE — PROGRESS NOTE ADULT - PROBLEM SELECTOR PLAN 2
On carbidopa-levodopa  Appears to be falling more frequently at home- says 2/2 dizziness will check   orthostaics negataive  cardiac etiology for fall- LBBB present in 2016, no CP.  TTE to assess for valvular disease   Patient currently oriented only to self + month. Unclear baseline, cannot reach . UA without evidence of infection On carbidopa-levodopa  Appears to be falling more frequently at home- says 2/2 dizziness  orthostatics negative  cardiac etiology for fall- LBBB present in 2016, no CP.  TTE to assess for valvular disease (pending)  Patient currently oriented only to self + month. Unclear baseline, cannot reach . UA without evidence of infection

## 2023-12-09 NOTE — DISCHARGE NOTE PROVIDER - HOSPITAL COURSE
HPI:  This is a 89 yo F PMH Parkinson's and vertigo who presented to the ER for falls. Reportedly fell 4 days ago, also fell yesterday AM and hit the left side of her face. Patient was having some R rib pain earlier but says it resolved, not endorsing any other pain. Lives with her .     Patient currently thinks she is at home, and says she is coming to the hospital tomorrow. Endorses living with her . Tried  x2, no answer for collateral.     In the ER, HD stable. Given tylenol + oxy. Imaging with no fractures.  (06 Dec 2023 15:20)    Hospital Course:  presented to the ER for falls. Endorsing some difficulty ambulating due to dizziness.      Rib fracture.   ·  Plan: CT chest with acute right lateral 10th rib fracture  Pain control with tylenol. Can add oxy 2.5mg PRN if needed    CTH, neck, maxillofacial with no fx, just left periorbital and premaxillary soft tissue swelling.    History of Parkinson's disease.   ·  Plan: On carbidopa-levodopa  Appears to be falling more frequently at home- says 2/2 dizziness will check   orthostatics negative  cardiac etiology for fall- LBBB present in 2016, no CP.  TTE to assess for valvular disease   Patient currently oriented only to self + month. Unclear baseline, cannot reach . UA without evidence of infection.    Vertigo.   ·  Plan: Describes less vertigo and more dizziness  orthostatics negative  meclizine not helping will DC  TTE  s/p IVF.    dc [facility] may benefit from vestibular PT after.    Important Medication Changes and Reason:    Active or Pending Issues Requiring Follow-up:    Advanced Directives:   [ x] Full code  [ ] DNR  [ ] Hospice    Discharge Diagnoses:  Fall  Rib fracture  Vertigo        HPI:  This is a 89 yo F PMH Parkinson's and vertigo who presented to the ER for falls. Reportedly fell 4 days ago, also fell yesterday AM and hit the left side of her face. Patient was having some R rib pain earlier but says it resolved, not endorsing any other pain. Lives with her .     Patient currently thinks she is at home, and says she is coming to the hospital tomorrow. Endorses living with her . Tried  x2, no answer for collateral.     In the ER, HD stable. Given tylenol + oxy. Imaging with no fractures.  (06 Dec 2023 15:20)    Hospital Course:  presented to the ER for falls. Endorsing some difficulty ambulating due to dizziness.      Rib fracture.   ·  Plan: CT chest with acute right lateral 10th rib fracture  Pain control with tylenol. Can add oxy 2.5mg PRN if needed    CTH, neck, maxillofacial with no fx, just left periorbital and premaxillary soft tissue swelling.    History of Parkinson's disease.   ·  Plan: On carbidopa-levodopa  Appears to be falling more frequently at home- says 2/2 dizziness will check   orthostatics negative  cardiac etiology for fall- LBBB present in 2016, no CP.  TTE to assess for valvular disease unremarkable  Patient currently oriented only to self + month. Unclear baseline, cannot reach . UA without evidence of infection.    Vertigo.   ·  Plan: Describes less vertigo and more dizziness  orthostatics negative  meclizine not helping will DC  TTE unremarkable   s/p IVF.    dc [facility] may benefit from vestibular PT after.    Important Medication Changes and Reason:    Active or Pending Issues Requiring Follow-up:  Vestibular therapy after rehab     Advanced Directives:   [ x] Full code  [ ] DNR  [ ] Hospice    Discharge Diagnoses:  Fall  Rib fracture  Vertigo        HPI:  This is a 89 yo F PMH Parkinson's and vertigo who presented to the ER for falls. Reportedly fell 4 days ago, also fell yesterday AM and hit the left side of her face. Patient was having some R rib pain earlier but says it resolved, not endorsing any other pain. Lives with her .     Patient currently thinks she is at home, and says she is coming to the hospital tomorrow. Endorses living with her . Tried  x2, no answer for collateral.     In the ER, HD stable. Given tylenol + oxy. Imaging with no fractures.  (06 Dec 2023 15:20)    Hospital Course:  presented to the ER for falls. Endorsing some difficulty ambulating due to dizziness.      Rib fracture.   ·  Plan: CT chest with acute right lateral 10th rib fracture  Pain control with tylenol. Can add oxy 2.5mg PRN if needed    CTH, neck, maxillofacial with no fx, just left periorbital and premaxillary soft tissue swelling.    History of Parkinson's disease.   ·  Plan: On carbidopa-levodopa  Appears to be falling more frequently at home- says 2/2 dizziness will check   orthostatics negative  cardiac etiology for fall- LBBB present in 2016, no CP.  TTE to assess for valvular disease unremarkable  Patient currently oriented only to self + month. Unclear baseline, cannot reach . UA without evidence of infection.    Vertigo.   ·  Plan: Describes less vertigo and more dizziness  orthostatics negative  meclizine not helping will DC  TTE unremarkable   s/p IVF.    dc [facility] may benefit from vestibular PT after.    Important Medication Changes and Reason:    Active or Pending Issues Requiring Follow-up:  Vestibular therapy after rehab     Advanced Directives:   [ x] Full code  [ ] DNR  [ ] Hospice    Discharge Diagnoses:  Fall  Rib fracture  Vertigo

## 2023-12-09 NOTE — PROGRESS NOTE ADULT - PROBLEM SELECTOR PLAN 3
Describes less vertigo and more dizziness  orthostatics negative  meclizine not helping will DC  TTE  Will give trial of IVF Describes less vertigo and more dizziness  orthostatics negative  meclizine not helping will DC  TTE pending

## 2023-12-10 ENCOUNTER — TRANSCRIPTION ENCOUNTER (OUTPATIENT)
Age: 88
End: 2023-12-10

## 2023-12-10 PROCEDURE — 97161 PT EVAL LOW COMPLEX 20 MIN: CPT

## 2023-12-10 PROCEDURE — 82728 ASSAY OF FERRITIN: CPT

## 2023-12-10 PROCEDURE — 84100 ASSAY OF PHOSPHORUS: CPT

## 2023-12-10 PROCEDURE — 93306 TTE W/DOPPLER COMPLETE: CPT

## 2023-12-10 PROCEDURE — 90715 TDAP VACCINE 7 YRS/> IM: CPT

## 2023-12-10 PROCEDURE — 83550 IRON BINDING TEST: CPT

## 2023-12-10 PROCEDURE — 71250 CT THORAX DX C-: CPT | Mod: MA

## 2023-12-10 PROCEDURE — 72125 CT NECK SPINE W/O DYE: CPT | Mod: MA

## 2023-12-10 PROCEDURE — 82607 VITAMIN B-12: CPT

## 2023-12-10 PROCEDURE — 83735 ASSAY OF MAGNESIUM: CPT

## 2023-12-10 PROCEDURE — 99239 HOSP IP/OBS DSCHRG MGMT >30: CPT

## 2023-12-10 PROCEDURE — 82746 ASSAY OF FOLIC ACID SERUM: CPT

## 2023-12-10 PROCEDURE — 83540 ASSAY OF IRON: CPT

## 2023-12-10 PROCEDURE — 85027 COMPLETE CBC AUTOMATED: CPT

## 2023-12-10 PROCEDURE — 85025 COMPLETE CBC W/AUTO DIFF WBC: CPT

## 2023-12-10 PROCEDURE — 73502 X-RAY EXAM HIP UNI 2-3 VIEWS: CPT

## 2023-12-10 PROCEDURE — 74176 CT ABD & PELVIS W/O CONTRAST: CPT | Mod: MA

## 2023-12-10 PROCEDURE — 36415 COLL VENOUS BLD VENIPUNCTURE: CPT

## 2023-12-10 PROCEDURE — 76376 3D RENDER W/INTRP POSTPROCES: CPT

## 2023-12-10 PROCEDURE — 81001 URINALYSIS AUTO W/SCOPE: CPT

## 2023-12-10 PROCEDURE — 70450 CT HEAD/BRAIN W/O DYE: CPT | Mod: MA

## 2023-12-10 PROCEDURE — 80053 COMPREHEN METABOLIC PANEL: CPT

## 2023-12-10 PROCEDURE — 72170 X-RAY EXAM OF PELVIS: CPT

## 2023-12-10 PROCEDURE — 80048 BASIC METABOLIC PNL TOTAL CA: CPT

## 2023-12-10 PROCEDURE — 99285 EMERGENCY DEPT VISIT HI MDM: CPT | Mod: 25

## 2023-12-10 PROCEDURE — 93005 ELECTROCARDIOGRAM TRACING: CPT

## 2023-12-10 PROCEDURE — 70486 CT MAXILLOFACIAL W/O DYE: CPT | Mod: MA

## 2023-12-10 PROCEDURE — 90471 IMMUNIZATION ADMIN: CPT

## 2023-12-10 RX ORDER — ACETAMINOPHEN 500 MG
2 TABLET ORAL
Qty: 0 | Refills: 0 | DISCHARGE
Start: 2023-12-10

## 2023-12-10 RX ORDER — CARBIDOPA AND LEVODOPA 25; 100 MG/1; MG/1
1 TABLET ORAL
Refills: 0 | DISCHARGE

## 2023-12-10 RX ORDER — MECLIZINE HCL 12.5 MG
1 TABLET ORAL
Refills: 0 | DISCHARGE

## 2023-12-10 RX ORDER — CARBIDOPA AND LEVODOPA 25; 100 MG/1; MG/1
1 TABLET ORAL
Qty: 0 | Refills: 0 | DISCHARGE
Start: 2023-12-10

## 2023-12-10 RX ORDER — LANOLIN ALCOHOL/MO/W.PET/CERES
1 CREAM (GRAM) TOPICAL
Qty: 0 | Refills: 0 | DISCHARGE
Start: 2023-12-10

## 2023-12-10 RX ADMIN — CARBIDOPA AND LEVODOPA 1 TABLET(S): 25; 100 TABLET ORAL at 14:07

## 2023-12-10 RX ADMIN — CARBIDOPA AND LEVODOPA 1 TABLET(S): 25; 100 TABLET ORAL at 05:47

## 2023-12-10 NOTE — DISCHARGE NOTE NURSING/CASE MANAGEMENT/SOCIAL WORK - PATIENT PORTAL LINK FT
You can access the FollowMyHealth Patient Portal offered by [facility] by registering at the following website: [email or web address]. By joining [employer]’s FollowMyHealth portal, you will also be able to view your health information using other applications (apps) compatible with our system. You can access the FollowMyHealth Patient Portal offered by [facility] by registering at the following website: [email or web address]. By joining [employer]’s FollowMyHealth portal, you will also be able to view your health information using other applications (apps) compatible with our system.

## 2023-12-10 NOTE — PROGRESS NOTE ADULT - NSPROGADDITIONALINFOA_GEN_ALL_CORE
Plan d/w ACP Ronak Martin, plan for dc to [facility] today w/ vestibular rehab after. Total discharge planning time 33 minutes Plan d/w ACP Ronak Martin, plan for dc to [facility] today w/ vestibular rehab after. Total discharge planning time 33 minutes

## 2023-12-10 NOTE — DISCHARGE NOTE NURSING/CASE MANAGEMENT/SOCIAL WORK - NSDCPEFALRISK_GEN_ALL_CORE
For information on Fall & Injury Prevention, visit: [email or web address] OR  [email or web address] OR  https://www.cdc.gov/steadi/patient.html For information on Fall & Injury Prevention, visit: [email or web address] OR  [email or web address] OR  https://www.cdc.gov/steadi/patient.html

## 2023-12-10 NOTE — PROGRESS NOTE ADULT - ASSESSMENT
This is a 89 yo F [facility] Parkinson's and vertigo who presented to the ER for falls. Endorsing some difficulty ambulating due to dizziness.  This is a 89 yo F [facility] Parkinson's and vertigo who presented to the ER for falls. Endorsing some difficulty ambulating due to dizziness.

## 2023-12-10 NOTE — PROGRESS NOTE ADULT - SUBJECTIVE AND OBJECTIVE BOX
Norma Kaplan MD  Division of Hospital Medicine  Reachable on MS Teams    PROGRESS NOTE:     Patient is a 89y old  Female who presents with a chief complaint of s/p fall (09 Dec 2023 12:39)      SUBJECTIVE / OVERNIGHT EVENTS: No acute events overnight, seen and evaluated this AM. Resting in bed, feels like her legs are weak. Pending dc to [facility] today, no complaints.     ADDITIONAL REVIEW OF SYSTEMS:    MEDICATIONS  (STANDING):  carbidopa/levodopa  10/100 1 Tablet(s) Oral three times a day  enoxaparin Injectable 40 milliGRAM(s) SubCutaneous every 24 hours  lactated ringers. 1000 milliLiter(s) (80 mL/Hr) IV Continuous <Continuous>    MEDICATIONS  (PRN):  acetaminophen     Tablet .. 650 milliGRAM(s) Oral every 6 hours PRN Temp greater or equal to 38C (100.4F), Mild Pain (1 - 3)  aluminum hydroxide/magnesium hydroxide/simethicone Suspension 30 milliLiter(s) Oral every 4 hours PRN Dyspepsia  melatonin 3 milliGRAM(s) Oral at bedtime PRN Insomnia  ondansetron Injectable 4 milliGRAM(s) IV Push every 8 hours PRN Nausea and/or Vomiting      CAPILLARY BLOOD GLUCOSE        I&O's Summary    09 Dec 2023 07:01  -  10 Dec 2023 07:00  --------------------------------------------------------  IN: 480 mL / OUT: 1100 mL / NET: -620 mL        PHYSICAL EXAM:  Vital Signs Last 24 Hrs  T(C): 36.3 (10 Dec 2023 06:08), Max: 36.4 (09 Dec 2023 20:54)  T(F): 97.4 (10 Dec 2023 06:08), Max: 97.5 (09 Dec 2023 20:54)  HR: 58 (10 Dec 2023 06:08) (58 - 63)  BP: 144/73 (10 Dec 2023 06:08) (133/65 - 144/73)  BP(mean): --  RR: 18 (10 Dec 2023 06:08) (17 - 18)  SpO2: 96% (10 Dec 2023 06:08) (96% - 100%)    Parameters below as of 10 Dec 2023 06:08  Patient On (Oxygen Delivery Method): room air        CONSTITUTIONAL: NAD, well-developed, bruise on face   RESPIRATORY: Normal respiratory effort; lungs are clear to auscultation bilaterally  CARDIOVASCULAR: Regular rate and rhythm, normal S1 and S2, no murmur/rub/gallop; No lower extremity edema; Peripheral pulses are 2+ bilaterally  ABDOMEN: Nontender to palpation, normoactive bowel sounds, no rebound/guarding; No hepatosplenomegaly  MUSCLOSKELETAL: no clubbing or cyanosis of digits; no joint swelling or tenderness to palpation  PSYCH: A+O to person, place, and time; affect appropriate    LABS:                        12.0   3.67  )-----------( 211      ( 09 Dec 2023 07:03 )             35.1     12-09    135  |  100  |  17  ----------------------------<  83  3.8   |  26  |  0.56    Ca    8.6      09 Dec 2023 07:02  Phos  2.7     12-09  Mg     2.0     12-09            Urinalysis Basic - ( 09 Dec 2023 07:02 )    Color: x / Appearance: x / SG: x / pH: x  Gluc: 83 mg/dL / Ketone: x  / Bili: x / Urobili: x   Blood: x / Protein: x / Nitrite: x   Leuk Esterase: x / RBC: x / WBC x   Sq Epi: x / Non Sq Epi: x / Bacteria: x          RADIOLOGY & ADDITIONAL TESTS:  Results Reviewed:   Imaging Personally Reviewed:  Electrocardiogram Personally Reviewed:    COORDINATION OF CARE:  Care Discussed with Consultants/Other Providers [Y/N]:  Prior or Outpatient Records Reviewed [Y/N]:

## 2023-12-10 NOTE — PROGRESS NOTE ADULT - PROBLEM SELECTOR PROBLEM 2
History of Parkinson's disease

## 2023-12-10 NOTE — PROGRESS NOTE ADULT - PROBLEM SELECTOR PLAN 2
On carbidopa-levodopa  Appears to be falling more frequently at home- says 2/2 dizziness  orthostatics negative  cardiac etiology for fall- LBBB present in 2016, no CP.  TTE unremarkable   Patient currently oriented only to self + month. Unclear baseline, cannot reach . UA without evidence of infection

## 2023-12-10 NOTE — DISCHARGE NOTE NURSING/CASE MANAGEMENT/SOCIAL WORK - NSDCVIVACCINE_GEN_ALL_CORE_FT
Tdap; 06-Dec-2023 12:06; Gayle Mejias (RN); [employer]; [id number] (Exp. Date: 20-Jul-2025); IntraMuscular; Deltoid Left.; 0.5 milliLiter(s); VIS (VIS Published: 20-Jun-2025, VIS Presented: 06-Dec-2023);    Tdap; 06-Dec-2023 12:06; Gayle Mejias (RN); [employer]; [id number] (Exp. Date: 20-Jul-2025); IntraMuscular; Deltoid Left.; 0.5 milliLiter(s); VIS (VIS Published: 20-Jun-2025, VIS Presented: 06-Dec-2023);

## 2023-12-10 NOTE — PROGRESS NOTE ADULT - PROBLEM SELECTOR PLAN 3
Describes less vertigo and more dizziness  orthostatics negative  meclizine not helping will DC  TTE unremarkable

## 2023-12-11 VITALS
SYSTOLIC BLOOD PRESSURE: 131 MMHG | TEMPERATURE: 98 F | HEART RATE: 60 BPM | OXYGEN SATURATION: 95 % | RESPIRATION RATE: 18 BRPM | DIASTOLIC BLOOD PRESSURE: 69 MMHG

## 2024-03-21 ENCOUNTER — INPATIENT (INPATIENT)
Facility: HOSPITAL | Age: 89
LOS: 5 days | Discharge: NURSING, REHAB OR LONG-TERM CARE | DRG: 552 | End: 2024-03-27
Attending: STUDENT IN AN ORGANIZED HEALTH CARE EDUCATION/TRAINING PROGRAM | Admitting: HOSPITALIST
Payer: MEDICARE

## 2024-03-21 VITALS
DIASTOLIC BLOOD PRESSURE: 81 MMHG | SYSTOLIC BLOOD PRESSURE: 147 MMHG | OXYGEN SATURATION: 99 % | HEIGHT: 62 IN | HEART RATE: 86 BPM | WEIGHT: 110.01 LBS | RESPIRATION RATE: 20 BRPM | TEMPERATURE: 98 F

## 2024-03-21 DIAGNOSIS — Z90.89 ACQUIRED ABSENCE OF OTHER ORGANS: Chronic | ICD-10-CM

## 2024-03-21 LAB
ALBUMIN SERPL ELPH-MCNC: 4.2 G/DL — SIGNIFICANT CHANGE UP (ref 3.3–5)
ALP SERPL-CCNC: 151 U/L — HIGH (ref 40–120)
ALT FLD-CCNC: 20 U/L — SIGNIFICANT CHANGE UP (ref 10–45)
ANION GAP SERPL CALC-SCNC: 10 MMOL/L — SIGNIFICANT CHANGE UP (ref 5–17)
AST SERPL-CCNC: 21 U/L — SIGNIFICANT CHANGE UP (ref 10–40)
BASOPHILS # BLD AUTO: 0.05 K/UL — SIGNIFICANT CHANGE UP (ref 0–0.2)
BASOPHILS NFR BLD AUTO: 0.5 % — SIGNIFICANT CHANGE UP (ref 0–2)
BILIRUB SERPL-MCNC: 0.4 MG/DL — SIGNIFICANT CHANGE UP (ref 0.2–1.2)
BUN SERPL-MCNC: 27 MG/DL — HIGH (ref 7–23)
CALCIUM SERPL-MCNC: 9.9 MG/DL — SIGNIFICANT CHANGE UP (ref 8.4–10.5)
CHLORIDE SERPL-SCNC: 98 MMOL/L — SIGNIFICANT CHANGE UP (ref 96–108)
CO2 SERPL-SCNC: 24 MMOL/L — SIGNIFICANT CHANGE UP (ref 22–31)
CREAT SERPL-MCNC: 0.58 MG/DL — SIGNIFICANT CHANGE UP (ref 0.5–1.3)
EGFR: 85 ML/MIN/1.73M2 — SIGNIFICANT CHANGE UP
EOSINOPHIL # BLD AUTO: 0.13 K/UL — SIGNIFICANT CHANGE UP (ref 0–0.5)
EOSINOPHIL NFR BLD AUTO: 1.2 % — SIGNIFICANT CHANGE UP (ref 0–6)
GLUCOSE SERPL-MCNC: 121 MG/DL — HIGH (ref 70–99)
HCT VFR BLD CALC: 36 % — SIGNIFICANT CHANGE UP (ref 34.5–45)
HGB BLD-MCNC: 12.4 G/DL — SIGNIFICANT CHANGE UP (ref 11.5–15.5)
IMM GRANULOCYTES NFR BLD AUTO: 0.4 % — SIGNIFICANT CHANGE UP (ref 0–0.9)
LYMPHOCYTES # BLD AUTO: 1.26 K/UL — SIGNIFICANT CHANGE UP (ref 1–3.3)
LYMPHOCYTES # BLD AUTO: 11.6 % — LOW (ref 13–44)
MCHC RBC-ENTMCNC: 31.1 PG — SIGNIFICANT CHANGE UP (ref 27–34)
MCHC RBC-ENTMCNC: 34.4 GM/DL — SIGNIFICANT CHANGE UP (ref 32–36)
MCV RBC AUTO: 90.2 FL — SIGNIFICANT CHANGE UP (ref 80–100)
MONOCYTES # BLD AUTO: 1.14 K/UL — HIGH (ref 0–0.9)
MONOCYTES NFR BLD AUTO: 10.5 % — SIGNIFICANT CHANGE UP (ref 2–14)
NEUTROPHILS # BLD AUTO: 8.24 K/UL — HIGH (ref 1.8–7.4)
NEUTROPHILS NFR BLD AUTO: 75.8 % — SIGNIFICANT CHANGE UP (ref 43–77)
NRBC # BLD: 0 /100 WBCS — SIGNIFICANT CHANGE UP (ref 0–0)
PLATELET # BLD AUTO: 353 K/UL — SIGNIFICANT CHANGE UP (ref 150–400)
POTASSIUM SERPL-MCNC: 4.3 MMOL/L — SIGNIFICANT CHANGE UP (ref 3.5–5.3)
POTASSIUM SERPL-SCNC: 4.3 MMOL/L — SIGNIFICANT CHANGE UP (ref 3.5–5.3)
PROT SERPL-MCNC: 6.6 G/DL — SIGNIFICANT CHANGE UP (ref 6–8.3)
RBC # BLD: 3.99 M/UL — SIGNIFICANT CHANGE UP (ref 3.8–5.2)
RBC # FLD: 12.9 % — SIGNIFICANT CHANGE UP (ref 10.3–14.5)
SODIUM SERPL-SCNC: 132 MMOL/L — LOW (ref 135–145)
WBC # BLD: 10.86 K/UL — HIGH (ref 3.8–10.5)
WBC # FLD AUTO: 10.86 K/UL — HIGH (ref 3.8–10.5)

## 2024-03-21 PROCEDURE — 71045 X-RAY EXAM CHEST 1 VIEW: CPT | Mod: 26

## 2024-03-21 PROCEDURE — 99285 EMERGENCY DEPT VISIT HI MDM: CPT | Mod: GC

## 2024-03-21 PROCEDURE — 73502 X-RAY EXAM HIP UNI 2-3 VIEWS: CPT | Mod: 26,RT

## 2024-03-21 PROCEDURE — 73552 X-RAY EXAM OF FEMUR 2/>: CPT | Mod: 26,RT

## 2024-03-21 PROCEDURE — 73564 X-RAY EXAM KNEE 4 OR MORE: CPT | Mod: 26,RT

## 2024-03-21 PROCEDURE — 72131 CT LUMBAR SPINE W/O DYE: CPT | Mod: 26,MC

## 2024-03-21 RX ORDER — ACETAMINOPHEN 500 MG
975 TABLET ORAL ONCE
Refills: 0 | Status: COMPLETED | OUTPATIENT
Start: 2024-03-21 | End: 2024-03-21

## 2024-03-21 RX ADMIN — Medication 975 MILLIGRAM(S): at 18:44

## 2024-03-21 NOTE — ED PROVIDER NOTE - WR ORDER NAME 4
82 yo M with lip swelling, likely angioedema. No rash or hypotension to suggest anaphylaxis. Will observe and reassess. Xray Chest 1 View AP/PA

## 2024-03-21 NOTE — ED PROVIDER NOTE - ATTENDING CONTRIBUTION TO CARE
Private Physician   89y f pmh Parkinsons dz, Last admit 12/2023 for vertigo/fall/rib fx. Now comes to ed c/o pain across lower back rad to rt leg, "I can't step on it" Has fallen three times. Pt lives w . Private Physician   89y f pmh Parkinsons dz, Last admit 12/2023 for vertigo/fall/rib fx. Now comes to ed c/o pain across lower back rad to rt leg, "I can't step on it" Has fallen three times. Pt lives w . Pt not ambulatory. PE WDWN female awake alert normocephalic atraumatic neck supple chest clear anterior & posterior cv no rubs, gallops or murmurs abd soft +bs no mass guarding neuro awake alert speech fluent power 5/5 all extr MSK TTP rt hip femur  Charly Brantley MD, Facep

## 2024-03-21 NOTE — ED ADULT NURSE NOTE - NSFALLHARMRISKINTERV_ED_ALL_ED
Assistance OOB with selected safe patient handling equipment if applicable/Assistance with ambulation/Communicate risk of Fall with Harm to all staff, patient, and family/Monitor gait and stability/Provide visual cue: red socks, yellow wristband, yellow gown, etc/Reinforce activity limits and safety measures with patient and family/Bed in lowest position, wheels locked, appropriate side rails in place/Call bell, personal items and telephone in reach/Instruct patient to call for assistance before getting out of bed/chair/stretcher/Non-slip footwear applied when patient is off stretcher/[place] to call system/Physically safe environment - no spills, clutter or unnecessary equipment/Purposeful Proactive Rounding/Room/bathroom lighting operational, light cord in reach

## 2024-03-21 NOTE — ED ADULT NURSE REASSESSMENT NOTE - NS ED NURSE REASSESS COMMENT FT1
Patient sitting comfortably in bed. Updated on POC and need for radiology scans for further dispo. No acute distress present at this time. Bed is in lowest position.

## 2024-03-21 NOTE — ED PROVIDER NOTE - SHIFT CHANGE DETAILS
Attending MD Villagran: 89F with L4 compression fx, R hip pain, pending admission, unable to ambulate

## 2024-03-21 NOTE — ED PROVIDER NOTE - PROGRESS NOTE DETAILS
Lisa De Jesus MD (PGY1) Consulted neurosurg. Based off imaging neurosurg says it is a minor fx, no surgical intervention indication at this time. Neurosurg will come see the pt. Lisa De Jesus MD (PGY1) Pt seen by neurosurgery, no surgical indication at this time. Pt to be admitted. Lisa De Jesus MD (PGY1) Consulted neurosurg. Based off imaging neurosurg says it is a minor fx, no surgical intervention indication at this time. Neurosurgery will come see the pt. Endorsed to Dr Sparkle Brantley MD, Facep

## 2024-03-21 NOTE — ED PROVIDER NOTE - CLINICAL SUMMARY MEDICAL DECISION MAKING FREE TEXT BOX
89yoF w/Hx of Parkinson's and vertigo p/w R leg pain s/p fall. Pt has had multiple falls over the last week 2/2 lumbar back pain, no prodromal symptoms prior to falling, today pt had worsening RLE pain and difficulty ambulating so she came to ED for evaluation. Reports falling onto her right side at least twice this week, no head trauma or LOC. Denies bladder/bowel incontinence, numbness/tingling in groin, focal weakness, HA, SOB, CP, fever, nausea/vomiting, abd pain, ROS otherwise negative. No AC use.     General: WN/WD NAD  Head: Atraumatic  Eyes: EOM grossly in tact, no scleral icterus  ENT: moist mucous membranes  Neurology: A&Ox3, nonfocal, QUINTERO x 4  Respiratory: normal respiratory effort  CV: Extremities warm and well perfused  Abdominal: Soft, non-distended  Extremities: No edema; no palpable bony deformities; +R hip ttp, FROM all extremities  Skin: No rashes    Will eval w/XRs of R hip/femur/knee, CT lumbar spine noncon, and admit pt as she is unable to ambulate and unsafe for DC given multiple falls in the last week. Pt agreeable to admission. - Aung Nicole, PGY-3 89yoF w/Hx of Parkinson's and vertigo p/w R leg pain s/p fall. Pt has had multiple falls over the last week 2/2 lumbar back pain, no prodromal symptoms prior to falling, today pt had worsening RLE pain and difficulty ambulating so she came to ED for evaluation. Reports falling onto her right side at least twice this week, no head trauma or LOC. Denies bladder/bowel incontinence, numbness/tingling in groin, focal weakness, HA, SOB, CP, fever, nausea/vomiting, abd pain, ROS otherwise negative. No AC use.     General: WN/WD NAD  Head: Atraumatic  Eyes: EOM grossly in tact, no scleral icterus  ENT: moist mucous membranes  Neurology: A&Ox3, nonfocal, QUINTERO x 4  Respiratory: normal respiratory effort  CV: Extremities warm and well perfused  Abdominal: Soft, non-distended  Extremities: No edema; no palpable bony deformities; +R hip ttp, FROM all extremities  Skin: No rashes    No red flag symptoms to suggest cord compression or other emergent spinal pathology. Will eval w/XRs of R hip/femur/knee, CT lumbar spine noncon, and admit pt as she is unable to ambulate and unsafe for DC given multiple falls in the last week. Pt agreeable to admission. - Aung Nicole, PGY-3

## 2024-03-21 NOTE — ED ADULT NURSE NOTE - OBJECTIVE STATEMENT
Patient BIBEMS c/o atraumatic back pain radiating to L leg. Patient reports being unable to ambulate due to leg pain. Denies any injury to area. Denies headache, sob, chest pain, urinary symptoms, n/v/d. Patient A&Ox2, but forgetful and poor historian. No signs of acute distress at this time. Plan of care in progress. Patient BIBEMS c/o atraumatic back pain radiating to R leg. Patient reports being unable to ambulate due to leg pain. Denies any injury to area. Denies headache, sob, chest pain, urinary symptoms, n/v/d. Patient A&Ox2, but forgetful and poor historian. No signs of acute distress at this time. Plan of care in progress.

## 2024-03-21 NOTE — ED ADULT NURSE REASSESSMENT NOTE - NS ED NURSE REASSESS COMMENT FT1
Patient cleaned and changed. Patient readjusted in bed for comfort. Patient's son spoken with on telephone and given update, per patient okay to speak with son about POC. No acute distress present at this time. Bed is in lowest position.

## 2024-03-21 NOTE — ED ADULT NURSE NOTE - NS ED NURSE LEVEL OF CONSCIOUSNESS AFFECT
LOV 7/23/2020.   Last filled on 10/1/2020. PDMP reviewed; no aberrant behavior identified, prescription authorized.  Meds refilled per epic.     
Patient is needing refill on    lidocaine (LIDODERM) 5 % patch   modafinil (PROVIGIL) 100 MG tablet    [address] [facility] [address]   
Calm

## 2024-03-22 DIAGNOSIS — S32.049A UNSPECIFIED FRACTURE OF FOURTH LUMBAR VERTEBRA, INITIAL ENCOUNTER FOR CLOSED FRACTURE: ICD-10-CM

## 2024-03-22 DIAGNOSIS — M25.551 PAIN IN RIGHT HIP: ICD-10-CM

## 2024-03-22 DIAGNOSIS — M79.651 PAIN IN RIGHT THIGH: ICD-10-CM

## 2024-03-22 DIAGNOSIS — E87.1 HYPO-OSMOLALITY AND HYPONATREMIA: ICD-10-CM

## 2024-03-22 DIAGNOSIS — M54.50 LOW BACK PAIN, UNSPECIFIED: ICD-10-CM

## 2024-03-22 LAB
ALBUMIN SERPL ELPH-MCNC: 3.8 G/DL — SIGNIFICANT CHANGE UP (ref 3.3–5)
ALP SERPL-CCNC: 131 U/L — HIGH (ref 40–120)
ALT FLD-CCNC: 14 U/L — SIGNIFICANT CHANGE UP (ref 10–45)
ANION GAP SERPL CALC-SCNC: 14 MMOL/L — SIGNIFICANT CHANGE UP (ref 5–17)
AST SERPL-CCNC: 18 U/L — SIGNIFICANT CHANGE UP (ref 10–40)
BILIRUB SERPL-MCNC: 0.4 MG/DL — SIGNIFICANT CHANGE UP (ref 0.2–1.2)
BUN SERPL-MCNC: 23 MG/DL — SIGNIFICANT CHANGE UP (ref 7–23)
CALCIUM SERPL-MCNC: 9.5 MG/DL — SIGNIFICANT CHANGE UP (ref 8.4–10.5)
CHLORIDE SERPL-SCNC: 101 MMOL/L — SIGNIFICANT CHANGE UP (ref 96–108)
CO2 SERPL-SCNC: 21 MMOL/L — LOW (ref 22–31)
CREAT SERPL-MCNC: 0.57 MG/DL — SIGNIFICANT CHANGE UP (ref 0.5–1.3)
EGFR: 85 ML/MIN/1.73M2 — SIGNIFICANT CHANGE UP
GLUCOSE SERPL-MCNC: 95 MG/DL — SIGNIFICANT CHANGE UP (ref 70–99)
HCT VFR BLD CALC: 34.7 % — SIGNIFICANT CHANGE UP (ref 34.5–45)
HGB BLD-MCNC: 11.9 G/DL — SIGNIFICANT CHANGE UP (ref 11.5–15.5)
MAGNESIUM SERPL-MCNC: 2.6 MG/DL — SIGNIFICANT CHANGE UP (ref 1.6–2.6)
MCHC RBC-ENTMCNC: 31.1 PG — SIGNIFICANT CHANGE UP (ref 27–34)
MCHC RBC-ENTMCNC: 34.3 GM/DL — SIGNIFICANT CHANGE UP (ref 32–36)
MCV RBC AUTO: 90.6 FL — SIGNIFICANT CHANGE UP (ref 80–100)
NRBC # BLD: 0 /100 WBCS — SIGNIFICANT CHANGE UP (ref 0–0)
PHOSPHATE SERPL-MCNC: 3.6 MG/DL — SIGNIFICANT CHANGE UP (ref 2.5–4.5)
PLATELET # BLD AUTO: 331 K/UL — SIGNIFICANT CHANGE UP (ref 150–400)
POTASSIUM SERPL-MCNC: 4.1 MMOL/L — SIGNIFICANT CHANGE UP (ref 3.5–5.3)
POTASSIUM SERPL-SCNC: 4.1 MMOL/L — SIGNIFICANT CHANGE UP (ref 3.5–5.3)
PROT SERPL-MCNC: 6.4 G/DL — SIGNIFICANT CHANGE UP (ref 6–8.3)
RBC # BLD: 3.83 M/UL — SIGNIFICANT CHANGE UP (ref 3.8–5.2)
RBC # FLD: 13.1 % — SIGNIFICANT CHANGE UP (ref 10.3–14.5)
SODIUM SERPL-SCNC: 136 MMOL/L — SIGNIFICANT CHANGE UP (ref 135–145)
WBC # BLD: 7.49 K/UL — SIGNIFICANT CHANGE UP (ref 3.8–10.5)
WBC # FLD AUTO: 7.49 K/UL — SIGNIFICANT CHANGE UP (ref 3.8–10.5)

## 2024-03-22 PROCEDURE — 99223 1ST HOSP IP/OBS HIGH 75: CPT | Mod: 25

## 2024-03-22 PROCEDURE — 72148 MRI LUMBAR SPINE W/O DYE: CPT | Mod: 26

## 2024-03-22 PROCEDURE — 99497 ADVNCD CARE PLAN 30 MIN: CPT | Mod: 25

## 2024-03-22 RX ORDER — IBUPROFEN 200 MG
400 TABLET ORAL EVERY 6 HOURS
Refills: 0 | Status: DISCONTINUED | OUTPATIENT
Start: 2024-03-22 | End: 2024-03-22

## 2024-03-22 RX ORDER — KETOROLAC TROMETHAMINE 30 MG/ML
15 SYRINGE (ML) INJECTION ONCE
Refills: 0 | Status: DISCONTINUED | OUTPATIENT
Start: 2024-03-22 | End: 2024-03-22

## 2024-03-22 RX ORDER — ACETAMINOPHEN 500 MG
1000 TABLET ORAL EVERY 8 HOURS
Refills: 0 | Status: COMPLETED | OUTPATIENT
Start: 2024-03-22 | End: 2024-03-25

## 2024-03-22 RX ORDER — TRAMADOL HYDROCHLORIDE 50 MG/1
25 TABLET ORAL EVERY 6 HOURS
Refills: 0 | Status: DISCONTINUED | OUTPATIENT
Start: 2024-03-22 | End: 2024-03-27

## 2024-03-22 RX ORDER — LIDOCAINE 4 G/100G
1 CREAM TOPICAL EVERY 24 HOURS
Refills: 0 | Status: DISCONTINUED | OUTPATIENT
Start: 2024-03-22 | End: 2024-03-22

## 2024-03-22 RX ORDER — ACETAMINOPHEN 500 MG
650 TABLET ORAL EVERY 6 HOURS
Refills: 0 | Status: DISCONTINUED | OUTPATIENT
Start: 2024-03-22 | End: 2024-03-22

## 2024-03-22 RX ORDER — SODIUM CHLORIDE 9 MG/ML
500 INJECTION, SOLUTION INTRAVENOUS
Refills: 0 | Status: COMPLETED | OUTPATIENT
Start: 2024-03-22 | End: 2024-03-22

## 2024-03-22 RX ORDER — LANOLIN ALCOHOL/MO/W.PET/CERES
3 CREAM (GRAM) TOPICAL AT BEDTIME
Refills: 0 | Status: DISCONTINUED | OUTPATIENT
Start: 2024-03-22 | End: 2024-03-27

## 2024-03-22 RX ORDER — LIDOCAINE 4 G/100G
1 CREAM TOPICAL EVERY 24 HOURS
Refills: 0 | Status: DISCONTINUED | OUTPATIENT
Start: 2024-03-22 | End: 2024-03-27

## 2024-03-22 RX ORDER — ONDANSETRON 8 MG/1
4 TABLET, FILM COATED ORAL EVERY 8 HOURS
Refills: 0 | Status: DISCONTINUED | OUTPATIENT
Start: 2024-03-22 | End: 2024-03-27

## 2024-03-22 RX ADMIN — Medication 15 MILLIGRAM(S): at 05:10

## 2024-03-22 RX ADMIN — Medication 1000 MILLIGRAM(S): at 13:42

## 2024-03-22 RX ADMIN — Medication 15 MILLIGRAM(S): at 05:17

## 2024-03-22 RX ADMIN — TRAMADOL HYDROCHLORIDE 25 MILLIGRAM(S): 50 TABLET ORAL at 18:05

## 2024-03-22 RX ADMIN — TRAMADOL HYDROCHLORIDE 25 MILLIGRAM(S): 50 TABLET ORAL at 17:05

## 2024-03-22 RX ADMIN — LIDOCAINE 1 PATCH: 4 CREAM TOPICAL at 19:44

## 2024-03-22 RX ADMIN — Medication 1000 MILLIGRAM(S): at 14:42

## 2024-03-22 RX ADMIN — LIDOCAINE 1 PATCH: 4 CREAM TOPICAL at 13:43

## 2024-03-22 RX ADMIN — SODIUM CHLORIDE 100 MILLILITER(S): 9 INJECTION, SOLUTION INTRAVENOUS at 06:41

## 2024-03-22 NOTE — H&P ADULT - PROBLEM SELECTOR PLAN 2
-does not correlate w/ L4 fx. CT showed L2 had narrowing, greater on R .   -pain control w/ acetaminophen and ibuprofen, lidocaine patch

## 2024-03-22 NOTE — H&P ADULT - PARTICIPANTS
Patient: Faustino Garvin Date: 2017   : 1966 Attending: Roque Chauhan MD   51 year old male Room: /A     Chief Complaint: malfunctioning mechanical aortic valve  Post-op Day #: 5  Surgical Procedure: redo sternotomy, redo-aortic valve replacement (21mm Magna Ease tissue valve)    Subjective: Called by bedside RN with K+ 6.9. HR remains 30's, patient symptomatic    Vital Last Value 24 Hour Range   Temperature 97.8 °F (36.6 °C) (17 1020) Temp  Min: 97.7 °F (36.5 °C)  Max: 98.6 °F (37 °C)   Pulse (!) 34 (17 1020) Pulse  Min: 33  Max: 82   Respiratory Rate 26 (17 1020) Resp  Min: 16  Max: 28   Non-Invasive   Blood Pressure 120/55 (17 1020) BP  Min: 94/45  Max: 145/68   Arterial  Blood Pressure 101/72 (17 1700) No Data Recorded   Pulse Oximetry 97 % (17 1020) SpO2  Min: 93 %  Max: 99 %     Oxygen: RA    Weight over the past 48 Hours:  Patient Vitals for the past 48 hrs:   Weight   17 0305 95.8 kg   17 0441 95.3 kg      Admit Weight:   Weight: 104.8 kg (17 1200)  BMI:   BMI (Calculated): 36.26 (17 1200)    Intake/Output:    Last Stool Occurrence: 1 (17 0610)    I/O this shift:  In: -   Out: 55 [Urine:55]    I/O last 3 completed shifts:  In: 1160 [P.O.:1160]  Out: 2400 [Urine:2400]      Intake/Output Summary (Last 24 hours) at 17 1214  Last data filed at 17 1200   Gross per 24 hour   Intake              960 ml   Output             1855 ml   Net             -895 ml       Rhythm: Heart block 30's    Physical Exam:   Heart: Irregularly irregular rhythm, S1, S2 normal and distant, bradycardic  Lungs: Diminished breath sounds  bilaterally  Abdomen: round, soft, non-tender tolerating PO. Nauseated  Incision(s): Sternal Clean, dry and intact  Neurologic: Lethargic but arousable, deaf  Extremities: edema Warm extremities. Mild generalized edema    Laboratory Results:      Recent Labs  Lab 17  0745 17  0452 17  0305  04/21/17  0700  04/19/17  0430  04/18/17  0400  04/17/17  1135  04/17/17  0036   SODIUM 128*  --   --   --   --  131*  --   --   --   --   --   --    POTASSIUM 6.9*  --  5.1 4.7  < > 5.2*  < > 5.0  < >  --   --   --    CHLORIDE 92*  --   --   --   --  95*  --   --   --   --   --   --    CO2 22  --   --   --   --  28  --   --   --   --   --   --    BUN 39*  --   --   --   --  26*  --   --   --   --   --   --    CREATININE 1.37*  --   --   --   --  0.61*  --   --   --   --   --   --    GLUCOSE 74  --   --   --   --  138*  --   --   --   --   --   --    MG 2.3  --  2.3 2.3  < > 2.1  --   --   --   --   --   --    WBC  --  22.4*  --   --   --  14.5*  --  13.7*  --  21.9*  < >  --    HGB  --  10.0*  --   --   --  9.3*  --  10.0*  --  13.2  < >  --    HCT  --  30.3*  --   --   --  27.7*  --  29.8*  --  39.4  < >  --    PLT  --  187  --   --   --  85*  --  76*  --  109*  < >  --    PT  --   --   --   --   --   --   --  12.6*  --  14.0*  --   --    INR  --   --   --   --   --   --   --  1.2  --  1.3  --   --    PTT  --  29  --   --   --   --   --   --   --  32*  --  49*   < > = values in this interval not displayed.    AB      Recent Labs  Lab 04/18/17  1115  04/17/17  1137   FIO2 ROOM AIR  < >  --    APH 7.39  < > 7.35   APO2 83  < > 109*   AHCO3 25  < > 22   ASAT  --   --  96   < > = values in this interval not displayed.      Cultures: Reviewed    Chest X-Ray: Reviewed    Medications/Infusions:  Scheduled:   • furosemide       • vancomycin (VANCOCIN) IVPB  1,500 mg Intravenous On Call to Procedure   • ceFAZolin  2,000 mg Intravenous On Call to Procedure   • furosemide  40 mg Oral Daily   • atorvastatin  40 mg Oral Nightly   • pantoprazole  40 mg Oral Daily   • aspirin  81 mg Oral Daily   • docusate sodium-sennosides  2 tablet Oral Daily   • polyethylene glycol  17 g Oral BID   • sodium chloride (PF)  10 mL Injection 3 times per day       Continuous Infusions:   • isoproterenol (ISUPREL) 1 mg in sodium chloride 0.9 % 250  mL infusion 5 mcg/min (04/22/17 1120)   • dextrose 5 % infusion     • dextrose 5 % / sodium chloride 0.45% infusion     • dextrose 5 % / sodium chloride 0.45% infusion 10 mL/hr at 04/17/17 1600       CMS Criteria:  ASA: Yes    BB: Beta-blocker not ordered due to Bradycardia and Beta-blocker not ordered due to Hypotension    Statin:Yes    ACE: Not Applicable    Surgical Care Improvement Project:  Jenkins in Place: No    DVT/VTE Prophylaxis:  VTE Pharmacologic Prophylaxis: No pharmacologic Venous Thromboembolism prophylaxis due to Active Bleeding / Risk of Bleeding  VTE Mechanical Prophylaxis: Yes    Antibiotics D/C'd within 24 hours of surgery end: Yes    Beta Blocker: Beta-blocker not ordered due to Bradycardia and Beta-blocker not ordered due to Hypotension     Surgical Central Line: No    Cardiologist: Monae  EF: 45  Nares negative  Hemoglobin A1c: 6.0  Preoperative Creatinine: 0.59    Assessment/Plan:  S/p redo sternotomy, redo-aortic valve replacement (21mm Magna Ease tissue valve)-   Rmaufqzsuty9cf/3rd degree heart block: PPM per EP when hyperkalemia corrected.   Hyperkalemia/ISABELLA: Likely from hypoperfusion secondary to bradycardia/hypotension. Insulin/D50/Calcium now. Place jenkins and give IV Lasix  Check ECHO to r/o pericardial effusion  Nausea: Likely secondary to above  Lethargy: Likely secondary to above. Monitor  Hx Stroke- PT/OT      Transfer to ICU for Isuprel. PPM when stable. May need TVP in meantime    Pathways:  Wires Out: Yes  Ambulating: Yes  Coumadin: Not Applicable    Discussed with or notes reviewed:  Patient, Family, RN and MD    Discharge Disposition: Home    Patient

## 2024-03-22 NOTE — H&P ADULT - NSHPPHYSICALEXAM_GEN_ALL_CORE
Vital Signs Last 24 Hrs  T(C): 36.6 (22 Mar 2024 02:29), Max: 36.9 (21 Mar 2024 20:12)  T(F): 97.8 (22 Mar 2024 02:29), Max: 98.4 (21 Mar 2024 20:12)  HR: 71 (22 Mar 2024 02:29) (69 - 86)  BP: 158/73 (22 Mar 2024 02:29) (128/73 - 158/73)  BP(mean): --  RR: 18 (22 Mar 2024 02:29) (18 - 20)  SpO2: 96% (22 Mar 2024 02:29) (96% - 99%)    Parameters below as of 22 Mar 2024 02:29  Patient On (Oxygen Delivery Method): room air        CONSTITUTIONAL: Well-groomed, in no apparent distress  EYES: No conjunctival or scleral injection, non-icteric  ENMT: No external nasal lesions; dry oral mucosa   RESPIRATORY: Breathing comfortably; lungs CTA without wheeze/rhonchi/rales  CARDIOVASCULAR: +S1S2, RRR, no M/G/R; pedal pulses full and symmetric; no lower extremity edema  GASTROINTESTINAL: No tenderness, +BS throughout, no rebound/guarding  SKIN: No rashes or ulcers noted  NEUROLOGIC: No gross focal neurological deficits, AAOX3. Of note, no stereotypical movement of Parkinsons despite not being on meds for a while.   PSYCHIATRIC: mood and affect appropriate; appropriate insight and judgment  MSK: R thigh pain elicited with passive and active ROM of R hip. ROM of lumbar spine elicits low back pain. No pain at rest.

## 2024-03-22 NOTE — CONSULT NOTE ADULT - SUBJECTIVE AND OBJECTIVE BOX
Zakia, Margie  89F, no AC/AP, presents s/p fall 2 days ago , now w/ difficulty ambulating, found to have a possible L4 compression fx w/ air tracking; no retropulseion or major deformity. No obvious involvement of posterior elements. Reports R hip pain.     --Anticoagulation--    T(C): 36.6 (03-22-24 @ 00:00), Max: 36.9 (03-21-24 @ 20:12)  HR: 69 (03-22-24 @ 00:00) (69 - 86)  BP: 128/73 (03-22-24 @ 00:00) (128/73 - 152/70)  RR: 18 (03-22-24 @ 00:00) (18 - 20)  SpO2: 97% (03-22-24 @ 00:00) (97% - 99%)  Wt(kg): --    Exam: neurointact, no back pain on palpation, R hip/thigh pain to palpation

## 2024-03-22 NOTE — H&P ADULT - NSICDXPASTMEDICALHX_GEN_ALL_CORE_FT
PAST MEDICAL HISTORY:  H/O Parkinson's disease      PAST MEDICAL HISTORY:  H/O Parkinson's disease     Vertigo

## 2024-03-22 NOTE — H&P ADULT - NSHPLABSRESULTS_GEN_ALL_CORE
12.4   10.86 )-----------( 353      ( 21 Mar 2024 18:25 )             36.0       03-21    132<L>  |  98  |  27<H>  ----------------------------<  121<H>  4.3   |  24  |  0.58    Ca    9.9      21 Mar 2024 18:25    TPro  6.6  /  Alb  4.2  /  TBili  0.4  /  DBili  x   /  AST  21  /  ALT  20  /  AlkPhos  151<H>  03-21              Urinalysis Basic - ( 21 Mar 2024 18:25 )    Color: x / Appearance: x / SG: x / pH: x  Gluc: 121 mg/dL / Ketone: x  / Bili: x / Urobili: x   Blood: x / Protein: x / Nitrite: x   Leuk Esterase: x / RBC: x / WBC x   Sq Epi: x / Non Sq Epi: x / Bacteria: x                  CAPILLARY BLOOD GLUCOSE

## 2024-03-22 NOTE — H&P ADULT - CONVERSATION DETAILS
Discussed patient's current prognosis and treatment options.  Discussed different life-sustaining measures including but not limited to noninvasive and/or invasive ventilatory support, cardiac resuscitation efforts, dialysis, IV fluid, IV antibiotics.     Patient AGREES to: noninvasive ventilatory support, invasive ventilatory support, and cardiac resuscitation.

## 2024-03-22 NOTE — PATIENT PROFILE ADULT - MONEY FOR FOOD
"Junito is a 60 year old who is being evaluated via a billable video visit.      How would you like to obtain your AVS? MyChart  If the video visit is dropped, the invitation should be resent by: Send to e-mail at: [email or web address]  Will anyone else be joining your video visit? No     Vitals - Patient Reported  Weight (Patient Reported): 80.9 kg (178 lb 5.6 oz)  Height (Patient Reported): 177.8 cm (5' 10\")  BMI (Based on Pt Reported Ht/Wt): 25.59  Pain Score: No Pain (0)  Video Start Time: 1:30 PM  Video-Visit Details    Type of service:  Video Visit    Video End Time:200    Originating Location (pt. Location): Home    Distant Location (provider location):  [facility]     Platform used for Video Visit: ChristianoYadio    Viola Youssef MA          INTERVENTIONAL RADIOLOGY CLINIC VISIT - NEW PATIENT      Date of this visit: 8/9/2021    Junito Wang  is referred by Dr. Steven for treatment recommendations. 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    As you are aware, Junito Wang is a 60 year old with history of stage IV metastatic gastroesophageal cancer undergoing palliative treatment. He developed stomach discomfort (8/2020) and solid food dysphagia (10/2020). Esophageal biopsy (1/2021) demonstrated moderately differentiated adenocarcinoma, and CT the same month showed disease involving the mediastinal lymph nodes, liver, and sacrum with additional lung nodules. PET and MRI brain imaging (2/2021) demonstrated additional lesions in the left choroid plexus and lymph nodes of the neck and abdomen. He was admitted for acute leg weakness and sensory " deficits (3/2021) with MRI showing possible epidural tumor for which he underwent L2-5 laminectomy, lumbar spine radiation and PEG placement. The patient subsequently underwent gamma knife (3/2021) and started chemotherapy (3/2021) with Cisplatin, keytruda and 5 FU. The patient's CT (5/2021) showed overall stable disease with some likely progression in the liver . His most recent CT (620/21) again showed overall stable disease except for increased size of multiple liver metastasis.    Today, patient is overall doing well without acute complaint. He detailed his clinical course and inquired into the utility of Y90 radioembolization.      PAST MEDICAL HISTORY:   Past Medical History:   Diagnosis Date     Arthritis      Hypertension      Malignant neoplasm of lower third of esophagus (H) 01/26/2021     Obesity (BMI 35.0-39.9) with comorbidity (H) 1/31/2019       PAST SURGICAL HISTORY:   Past Surgical History:   Procedure Laterality Date     ABDOMEN SURGERY  1992    fatty mass removed     APPENDECTOMY  1987     BIOPSY Left 02/26/2021    Left pelvic mass bone biopsy     COLONOSCOPY WITH CO2 INSUFFLATION N/A 01/26/2021    Procedure: COLONOSCOPY, WITH CO2 INSUFFLATION;  Surgeon: Nain Dominguez MD;  Location:  OR     COMBINED ESOPHAGOSCOPY, GASTROSCOPY, DUODENOSCOPY (EGD) WITH CO2 INSUFFLATION N/A 01/26/2021    Procedure: ESOPHAGOGASTRODUODENOSCOPY, WITH CO2 INSUFFLATION;  Surgeon: Nain Dominguez MD;  Location:  OR     ENDOSCOPIC INSERTION TUBE GASTROSTOMY N/A 03/05/2021    Procedure: Percutaneous endoscopic gastrostomy tube placement;  Surgeon: Jose Curiel MD;  Location: [facility] OR     ESOPHAGOSCOPY, GASTROSCOPY, DUODENOSCOPY (EGD), COMBINED N/A 01/26/2021    Procedure: Esophagogastroduodenoscopy, With Biopsy;  Surgeon: Nain Dominguez MD;  Location:  OR     GI SURGERY      Upper Endoscopy     INSERT PORT VASCULAR ACCESS Right 3/29/2021    Procedure: CHEST INSERTION, VASCULAR ACCESS PORT @0900;   Surgeon: Roderick Prakash MD;  Location: [facility] OR     IR CHEST PORT PLACEMENT > 5 YRS OF AGE  3/29/2021     LAMINECTOMY LUMBAR POSTERIOR MICROSCOPIC THREE+ LEVELS N/A 2021    Procedure: Lumbar 2 to Lumbar 5 Laminectomy;  Surgeon: Soy Gusman MD;  Location: [facility] OR     TONSILLECTOMY         FAMILY HISTORY:   Family History   Problem Relation Age of Onset     Hypertension Mother      Neurologic Disorder Mother         stroke     Hyperlipidemia Mother      Cerebrovascular Disease Mother          of stroke     Thyroid Disease Mother      Heart Disease Father      Heart Failure Father      Coronary Artery Disease Father          of heart attack     Hypertension Brother      Coronary Artery Disease Brother         Quadruple Bi-pass/Quadruple Bi-pass     Hypertension Sister      Coronary Artery Disease Sister         Stent/Stent     Hypertension Brother      Hyperlipidemia Brother      Hypertension Sister      Hyperlipidemia Sister      Cancer No family hx of        SOCIAL HISTORY:   Social History     Tobacco Use     Smoking status: Current Every Day Smoker     Packs/day: 0.50     Years: 44.00     Pack years: 22.00     Types: Cigarettes     Start date:      Smokeless tobacco: Never Used     Tobacco comment: Patient reports currently smoking 1/2 PPD since hospital discharge and wearing a Nicotene Patch   Substance Use Topics     Alcohol use: Not Currently       PROBLEM LIST:   Patient Active Problem List    Diagnosis Date Noted     Metastasis from esophageal cancer (H) 2021     Priority: Medium     Liver metastases (H) 2021     Priority: Medium     Bone metastasis (H) 2021     Priority: Medium     Urine retention 2021     Priority: Medium     Left leg weakness 2021     Priority: Medium     Anemia, unspecified type 2021     Priority: Medium     Leukocytosis, unspecified type 2021     Priority: Medium     Malignant neoplasm of esophagus, unspecified  location (H) 03/03/2021     Priority: Medium     Acute low back pain, unspecified back pain laterality, unspecified whether sciatica present 03/03/2021     Priority: Medium     Malignant neoplasm of lower third of esophagus (H) 03/01/2021     Priority: Medium     Added automatically from request for surgery [id number]       Esophageal dysphagia 03/01/2021     Priority: Medium     Added automatically from request for surgery [id number]       Advanced directives, counseling/discussion 10/30/2017     Priority: Medium     Hypertension goal BP (blood pressure) < 140/90 06/14/2015     Priority: Medium       MEDICATIONS:   Prescription Medications as of 8/9/2021       Rx Number Disp Refills Start End Last Dispensed Date Next Fill Date Owning Pharmacy    Calcium Carb-Cholecalciferol (CALCIUM-VITAMIN D) 600-400 MG-UNIT TABS  60 tablet 3 6/2/2021    [facility] [place] [facility], [address]    Sig: Take 2 tablets by mouth daily    Class: E-Prescribe    Route: Oral    dexamethasone (DECADRON) 4 MG tablet  6 tablet 2 6/23/2021    [facility] #[facility] [place] [facility], [address]    Sig: Take 2 tablets (8 mg) by mouth daily (with breakfast) for 3 days, starting day after Cisplatin (Day 2).    Class: E-Prescribe    Route: Oral    gabapentin (NEURONTIN) 250 MG/5ML solution  470 mL 1 2/8/2021    [facility] [place] [facility], [address]    Sig: Take 5 mLs (250 mg) by mouth At Bedtime    Class: E-Prescribe    Route: Oral    LORazepam (ATIVAN) 0.5 MG tablet  30 tablet 2 3/30/2021        Sig: Take 1 tablet (0.5 mg) by mouth every 4 hours as needed (Anxiety, Nausea/Vomiting or Sleep)    Class: Local Print    Route: Oral    methocarbamol (ROBAXIN) 750 MG tablet  30 tablet 0 3/31/2021    [facility] #[facility] [place] [facility], [address]    Sig: Take 1 tablet (750 mg) by mouth 4 times daily    Class: Historical    Route: Oral    multivitamin CF FORMULA  (CHOICEFUL) chewable tablet        [id number] - [place] - [address]     Sig: Take 1 tablet by mouth daily    Class: Historical    Route: Oral    omeprazole (FIRST-OMEPRAZOLE) 2 MG/ML SUSP  300 mL 1 3/31/2021    [id number]  [place] - [address]     Sig: Take 10 mLs (20 mg) by mouth 2 times daily    Class: E-Prescribe    Route: Oral    omeprazole POWD powder    4/1/2021        Class: Historical    prochlorperazine (COMPAZINE) 10 MG tablet  30 tablet 2 3/30/2021    [id number] - [place] - [address]     Sig: Take 1 tablet (10 mg) by mouth every 6 hours as needed (Nausea/Vomiting)    Class: E-Prescribe    Route: Oral          ALLERGIES:   Patient has no known allergies.    General: Negative for recent fever.  Skin: Negative for jaundice.  Eyes: Negative for jaundice.  Respiratory: Negative for shortness of breath.  Cardiovascular: Negative for chest pain.  Gastrointestinal: Negative for abdominal pain or swelling, nausea, vomiting, or diarrhea.  Musculoskeletal: Negative for ankle swelling.      Physical Examination:   VITALS:   There were no vitals taken for this visit.  A limited virtual physical exam was performed.  General: Awake, alert, NAD  Resp: Breathing comfortably on room air    Labs:    Lab Results   Component Value Date    HGB 11.5 08/05/2021    HGB 11.4 06/24/2021     Lab Results   Component Value Date     08/05/2021     06/24/2021     Lab Results   Component Value Date    WBC 6.0 08/05/2021    WBC 6.6 06/24/2021       Lab Results   Component Value Date    INR 1.16 03/08/2021       Lab Results   Component Value Date    PROTTOTAL 6.7 08/05/2021    PROTTOTAL 6.8 06/24/2021      Lab Results   Component Value Date    ALBUMIN 3.5 08/05/2021    ALBUMIN 3.6 06/24/2021     Lab Results   Component Value Date    BILITOTAL 0.1 08/05/2021    BILITOTAL 0.3 06/24/2021     No results found for: BILICONJ   Lab  Results   Component Value Date    ALKPHOS 124 08/05/2021    ALKPHOS 110 06/24/2021     Lab Results   Component Value Date    AST 13 08/05/2021    AST 16 06/24/2021     Lab Results   Component Value Date    ALT 17 08/05/2021    ALT 20 06/24/2021       Lab Results   Component Value Date    CR 0.73 08/05/2021    CR 0.65 06/24/2021     No results found for: GFR    No results found for: FETO    Diagnostic studies:   I reviewed the CT CAP from 8/3/21  1.  Slight increased size of hepatic metastases.  2.  Stable osseous metastases with the largest lesion in the sacrum.  3.  Stable wall thickening of the distal esophagus.    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 The esophageal, osseous, brain and georgina components of his disease are stable and continue to respond to systemic treatment. His progression is limited to his liver and this clearly represents the greatest threat to his immediate survival. Therefore, the patient may benefit from locoregional treatment of his liver with Y90 radioembolization.    ECOG performance status: 0    I believe the patient is a suitable candidate for a radioembolization procedure.    I showed Mr. Wang the imaging and discussed the findings. I discussed with Mr. Wang that the goal of the procedure is disease control with a survival benefit rather than a cure given the nature of the disease, but that sometimes lesions will be cured in this manner. Alternatives were reviewed, including surgery, but the patient is not a surgical candidate.  I explained the radioembolization procedure  - that it is a two step procedure on two different days that involves an angiogram and nuclear medicine study on each day, and that it requires insurance pre-approval. I explained that the first procedure involves mapping the  arteries to the liver and embolizing any sidebranches that place the patient at risk of non-target radiation injury, then checking for shunting to the lungs. The second procedure, assuming relevant sidebranches were embolizable and the lung shunting is not too high, involves delivering the radiation beads intra-arterially and imaging the liver afterwards to assess the pattern of radiation distribution.   I explained that both procedures are performed under conscious sedation. We discussed what will happen before, during and after the procedure; what to expect in the post procedure recovery period; and what the follow-up will be. We discussed the risks of the procedure which include, but are not limited to, vascular injury causing bleeding or occlusion of blood vessels, groin hematoma, infection, biloma, liver failure, non-target radiation injury to structures such as gallbladder/bowel/pancreas/lung (with risks such as bowel ulceration, pancreatitis or pneumonitis), and incomplete treatment. Risks are increased the greater the deviation from normal lab values, especially albumin and total bilirubin, and also the larger the area we must treat. We also discussed the post-radioembolization syndrome which consists of varying degrees of pain, nausea, and lethargy. I also explained that new tumors may develop elsewhere given the nature of the disease. Most patients go home the same day, but occasionally circumstances are such that a longer admission may be required. I also informed the patient that I will be assisted during the procedure by a fellow and/or resident, and that sometimes a medical student may be observing. All of Mr Wang's questions were answered and Mr. Wang agreed to proceed.       Plan   Pending insurance pre-approval, we will schedule Mr. Wang for the Y90 mapping procedure accordingly.    It was a pleasure seeing the patient.     Signed,    Rosales Meléndez M.D.  Department of Interventional  Radiology  [facility]       CC  Patient Care Team:  Negra Kim CNP as PCP - General  Roderick Sánchez, RN as Specialty Care Coordinator (Oncology)  Jose Steven MD as MD (Hematology & Oncology)  Emilia Qureshi MD as MD (Radiation Oncology)  Negra Kim CNP as Assigned PCP  Whit Hatfield CNP as Nurse Practitioner (Urology)  Trisha Garcia RN as Registered Nurse  Emilia Qureshi MD as Assigned Cancer Care Provider  Chas Baptiste MD as Assigned Neuroscience Provider  Angelica Lloyd PA-C as Assigned Surgical Provider  JOSE STEVEN                   no

## 2024-03-22 NOTE — PATIENT PROFILE ADULT - NSPROIMPLANTSMEDDEV_GEN_A_NUR
I received a PA form from  [facility] for authorization of .  This was completed and faxed back to [facility].  [facility] and signed by the MD.  This was taken care of by Nancy ABBASIin Dr Tirado's office.   None

## 2024-03-22 NOTE — H&P ADULT - NSHPADDITIONALINFOADULT_GEN_ALL_CORE
Fluid: ordered for 500cc LR. PO encouraged   Electrolytes: replete potassium, phosphorus and magnesium to 4/3/2 respectively  Nutrition: regular   Activity: as tolerated     CODE STATUS: full code   Preferred contact:   Jax Koehler, [phone]    Med rec:   Done with patient verbally, pt on no rx meds.

## 2024-03-22 NOTE — CONSULT NOTE ADULT - ASSESSMENT
Zakia, Margie  89F, no AC/AP, presents s/p fall 2 days ago , now w/ difficulty ambulating, found to have a possible L4 compression fx w/ air tracking; no retropulseion or major deformity. No obvious involvement of posterior elements. Reports R hip pain. Exam: neurointact, no back pain on palpation, R hip/thigh pain to palpation    -Recommend pain control and PT  -R hip imaging and evaluation   -Consider cards consultation for c/o dizziness

## 2024-03-22 NOTE — H&P ADULT - HISTORY OF PRESENT ILLNESS
89F PMHx Parkinson.   Presenting w/ worsening RLE pain and difficulty ambulating. Pt had multiple falls over the last week 2/2 low back pain.  89F PMHx questionable Parkinson, currently not on meds.   Presenting w/ worsening RLE pain and difficulty ambulating. Pt had multiple falls over the last week 2/2 low back pain. After falling, she's had R thigh pain on movement as well.   Denies fever, chills, cp, sob, cough, palpitation, abd pain, n/v/d, or significant change in bowel/urinary habits. Of note, pt denied dizziness to me today. She does have h/o vertigo, and was referred to vestibular rehab last year.     In the ED, VSS.   CBC w/ wbc 10.8, hgb 12.4, plt 353   CMP w/ Na 132, BUN 27, SCr 0.58, alk phos 151.   XR of R knee, femur and pelvis neg for fx.   CT lumbar showed narrowing at L2 and recent L4 fx with air tracking.   Seen by NSGY in the ED, no acute surg intervention recommended.

## 2024-03-23 LAB
ANION GAP SERPL CALC-SCNC: 12 MMOL/L — SIGNIFICANT CHANGE UP (ref 5–17)
BUN SERPL-MCNC: 23 MG/DL — SIGNIFICANT CHANGE UP (ref 7–23)
CALCIUM SERPL-MCNC: 9.6 MG/DL — SIGNIFICANT CHANGE UP (ref 8.4–10.5)
CHLORIDE SERPL-SCNC: 104 MMOL/L — SIGNIFICANT CHANGE UP (ref 96–108)
CO2 SERPL-SCNC: 22 MMOL/L — SIGNIFICANT CHANGE UP (ref 22–31)
CREAT SERPL-MCNC: 0.66 MG/DL — SIGNIFICANT CHANGE UP (ref 0.5–1.3)
EGFR: 82 ML/MIN/1.73M2 — SIGNIFICANT CHANGE UP
GLUCOSE SERPL-MCNC: 78 MG/DL — SIGNIFICANT CHANGE UP (ref 70–99)
HCT VFR BLD CALC: 35.8 % — SIGNIFICANT CHANGE UP (ref 34.5–45)
HGB BLD-MCNC: 12 G/DL — SIGNIFICANT CHANGE UP (ref 11.5–15.5)
MCHC RBC-ENTMCNC: 30.7 PG — SIGNIFICANT CHANGE UP (ref 27–34)
MCHC RBC-ENTMCNC: 33.5 GM/DL — SIGNIFICANT CHANGE UP (ref 32–36)
MCV RBC AUTO: 91.6 FL — SIGNIFICANT CHANGE UP (ref 80–100)
NRBC # BLD: 0 /100 WBCS — SIGNIFICANT CHANGE UP (ref 0–0)
PLATELET # BLD AUTO: 335 K/UL — SIGNIFICANT CHANGE UP (ref 150–400)
POTASSIUM SERPL-MCNC: 4.5 MMOL/L — SIGNIFICANT CHANGE UP (ref 3.5–5.3)
POTASSIUM SERPL-SCNC: 4.5 MMOL/L — SIGNIFICANT CHANGE UP (ref 3.5–5.3)
RBC # BLD: 3.91 M/UL — SIGNIFICANT CHANGE UP (ref 3.8–5.2)
RBC # FLD: 13 % — SIGNIFICANT CHANGE UP (ref 10.3–14.5)
SODIUM SERPL-SCNC: 138 MMOL/L — SIGNIFICANT CHANGE UP (ref 135–145)
WBC # BLD: 7.79 K/UL — SIGNIFICANT CHANGE UP (ref 3.8–10.5)
WBC # FLD AUTO: 7.79 K/UL — SIGNIFICANT CHANGE UP (ref 3.8–10.5)

## 2024-03-23 PROCEDURE — 99233 SBSQ HOSP IP/OBS HIGH 50: CPT

## 2024-03-23 RX ADMIN — LIDOCAINE 1 PATCH: 4 CREAM TOPICAL at 20:47

## 2024-03-23 RX ADMIN — LIDOCAINE 1 PATCH: 4 CREAM TOPICAL at 13:37

## 2024-03-23 RX ADMIN — Medication 1000 MILLIGRAM(S): at 21:00

## 2024-03-23 RX ADMIN — LIDOCAINE 1 PATCH: 4 CREAM TOPICAL at 20:46

## 2024-03-23 RX ADMIN — LIDOCAINE 1 PATCH: 4 CREAM TOPICAL at 00:57

## 2024-03-23 RX ADMIN — Medication 1000 MILLIGRAM(S): at 13:38

## 2024-03-23 RX ADMIN — Medication 1000 MILLIGRAM(S): at 21:30

## 2024-03-23 NOTE — PROVIDER CONTACT NOTE (MEDICATION) - BACKGROUND
89F PMH Parkinsons (not on ant medication). pt was previously admitted for vertigo/fall/rib fx. pt p/w lower back pain radiating to RLE in ED. pt had 3 prior falls.

## 2024-03-23 NOTE — PROVIDER CONTACT NOTE (MEDICATION) - ASSESSMENT
pt is ao4. pt is on RA and is saturating well. pt is incontinent x2. pt has a primafit in place. pt had 2 BMs in the earlier shift. pt is a 2 assist OOB. pt's skin is warm and pale. pt had a lidocaine patch on her right thigh. pt had a BM and soiled the lidocaine patch. the patch was removed early because of the soiling. pt has B/L 20 gauge IVs. both flush well and their dressings remain c/d/i. pt denies any pain, n/v, or any discomforts at this time.

## 2024-03-24 PROCEDURE — 99232 SBSQ HOSP IP/OBS MODERATE 35: CPT

## 2024-03-24 RX ADMIN — Medication 1000 MILLIGRAM(S): at 13:49

## 2024-03-24 RX ADMIN — LIDOCAINE 1 PATCH: 4 CREAM TOPICAL at 19:43

## 2024-03-24 RX ADMIN — LIDOCAINE 1 PATCH: 4 CREAM TOPICAL at 13:48

## 2024-03-24 RX ADMIN — Medication 1000 MILLIGRAM(S): at 05:10

## 2024-03-25 PROCEDURE — 99233 SBSQ HOSP IP/OBS HIGH 50: CPT

## 2024-03-25 RX ORDER — SENNA PLUS 8.6 MG/1
2 TABLET ORAL AT BEDTIME
Refills: 0 | Status: DISCONTINUED | OUTPATIENT
Start: 2024-03-25 | End: 2024-03-25

## 2024-03-25 RX ORDER — ACETAMINOPHEN 500 MG
975 TABLET ORAL EVERY 8 HOURS
Refills: 0 | Status: DISCONTINUED | OUTPATIENT
Start: 2024-03-25 | End: 2024-03-27

## 2024-03-25 RX ORDER — POLYETHYLENE GLYCOL 3350 17 G/17G
17 POWDER, FOR SOLUTION ORAL DAILY
Refills: 0 | Status: DISCONTINUED | OUTPATIENT
Start: 2024-03-25 | End: 2024-03-27

## 2024-03-25 RX ORDER — POLYETHYLENE GLYCOL 3350 17 G/17G
17 POWDER, FOR SOLUTION ORAL DAILY
Refills: 0 | Status: DISCONTINUED | OUTPATIENT
Start: 2024-03-25 | End: 2024-03-25

## 2024-03-25 RX ADMIN — Medication 975 MILLIGRAM(S): at 13:06

## 2024-03-25 RX ADMIN — TRAMADOL HYDROCHLORIDE 25 MILLIGRAM(S): 50 TABLET ORAL at 23:12

## 2024-03-25 RX ADMIN — LIDOCAINE 1 PATCH: 4 CREAM TOPICAL at 13:41

## 2024-03-25 RX ADMIN — LIDOCAINE 1 PATCH: 4 CREAM TOPICAL at 01:05

## 2024-03-25 RX ADMIN — Medication 975 MILLIGRAM(S): at 12:51

## 2024-03-25 RX ADMIN — Medication 1000 MILLIGRAM(S): at 05:44

## 2024-03-25 RX ADMIN — LIDOCAINE 1 PATCH: 4 CREAM TOPICAL at 19:45

## 2024-03-25 NOTE — PHYSICAL THERAPY INITIAL EVALUATION ADULT - PERTINENT HX OF CURRENT PROBLEM, REHAB EVAL
89F PMHx questionable Parkinson, currently not on meds.   Presenting w/ worsening RLE pain and difficulty ambulating. Pt had multiple falls over the last week 2/2 low back pain. After falling, she's had R thigh pain on movement as well. Denies fever, chills, cp, sob, cough, palpitation, abd pain, n/v/d, or significant change in bowel/urinary habits.  -XR of R knee, femur and pelvis neg for fx.   -CT lumbar showed narrowing at L2 and recent L4 fx with air tracking.   -Seen by NSGY in the ED, no acute surg intervention recommended

## 2024-03-25 NOTE — PHYSICAL THERAPY INITIAL EVALUATION ADULT - ADDITIONAL COMMENTS
Patient reports she lives with spouse in an apartment with elevator access. She was independent prior, however multiple falls recently. She owns a RW, but doesn't use it. Has a HHA appox 7 hours/5days a week.

## 2024-03-25 NOTE — PHYSICAL THERAPY INITIAL EVALUATION ADULT - GENERAL OBSERVATIONS, REHAB EVAL
unable to assess pt is intubated and sedated Patient encountered supine in bed, NAD and agreeable to PT eval

## 2024-03-25 NOTE — CHART NOTE - NSCHARTNOTEFT_GEN_A_CORE
Zakia — patient in PCU. Primary obtained MRI L spine and asking us to review, which redemonstrates acute L4 compfx. No significant posterior column edema.     -No neurosurgical intervention
Pt seen and examined, H+P from this AM reviewed    Pt admitted with worsening back pain radiating down her R leg, to the point she was unable to ambulate comfortably. Pain is present more with attempted ambulation. States she normally walks independently, but has been reliant on a walker recently, and the unable to ambulate which prompted ED visit.  This AM, states she feels comfortable while in bed and while resting, no back pain or pain in R leg; denies any other symptoms including CP, SOB, HA, n/v, abd pain, ate breakfast this AM, had BM this AM.   Exam notable for frail elderly woman in NAD, S1, S2, RRR, no abd tenderness, no LE swelling, able to lift LE above gravity   Labs non actionable   CT showing possible L4 fracture     Plan:  - switched pain to standing tylenol 1g q8h x 3 days   - tramadol 25mg prn q6h for mod/severe pain   - lidocaine patch   - pending MRI L spine for possible L4 fracture - NSX following   - pending PT eval after MRI  - discussed resuscitation status with pt and son Alcon at bedside - would like to further discuss with both of her sons prior to coming to decision - currently remains full code     plan d/w pt and son at bedside
Patient was measured and dispensed a LSO with a rigid anterior and posterior panels. The LSO will stabilize and control the spine reduce pain and ROM and assist in the healing process. Contact info was given. All went without incident.   [facility]  [phone]

## 2024-03-26 ENCOUNTER — TRANSCRIPTION ENCOUNTER (OUTPATIENT)
Age: 89
End: 2024-03-26

## 2024-03-26 PROCEDURE — 99233 SBSQ HOSP IP/OBS HIGH 50: CPT

## 2024-03-26 RX ADMIN — TRAMADOL HYDROCHLORIDE 25 MILLIGRAM(S): 50 TABLET ORAL at 06:18

## 2024-03-26 RX ADMIN — TRAMADOL HYDROCHLORIDE 25 MILLIGRAM(S): 50 TABLET ORAL at 00:12

## 2024-03-26 RX ADMIN — LIDOCAINE 1 PATCH: 4 CREAM TOPICAL at 20:05

## 2024-03-26 RX ADMIN — LIDOCAINE 1 PATCH: 4 CREAM TOPICAL at 13:26

## 2024-03-26 RX ADMIN — TRAMADOL HYDROCHLORIDE 25 MILLIGRAM(S): 50 TABLET ORAL at 07:18

## 2024-03-26 RX ADMIN — LIDOCAINE 1 PATCH: 4 CREAM TOPICAL at 01:44

## 2024-03-26 NOTE — DISCHARGE NOTE PROVIDER - NSDCCPCAREPLAN_GEN_ALL_CORE_FT
PRINCIPAL DISCHARGE DIAGNOSIS  Diagnosis: Back pain  Assessment and Plan of Treatment: You were found to have a L4 compression fracture on imaging. Neurosurgery saw you and there are no surgical interventions recommended. A TLSO brace was ordered and delievered please utilize the brace when ambulating to stablize your spine. Continue pain medications and physical therapy at rehab.

## 2024-03-26 NOTE — DISCHARGE NOTE PROVIDER - NSDCMRMEDTOKEN_GEN_ALL_CORE_FT
LSO Brace: Height: 157.5cm (5.17ft)  Weight: 49.9kg (110lbs)  Dx: S32. 040A: Wedge compression fracture of fourth lumbar vertebra, initial encounter for closed fracture   acetaminophen 325 mg oral tablet: 3 tab(s) orally every 8 hours As needed Mild Pain (1 - 3)  aluminum hydroxide-magnesium hydroxide 200 mg-200 mg/5 mL oral suspension: 30 milliliter(s) orally every 4 hours As needed Dyspepsia  lidocaine 4% topical film: Apply topically to affected area once a day  LSO Brace: Height: 157.5cm (5.17ft)  Weight: 49.9kg (110lbs)  Dx: S32. 040A: Wedge compression fracture of fourth lumbar vertebra, initial encounter for closed fracture  melatonin 3 mg oral tablet: 1 tab(s) orally once a day (at bedtime) As needed Insomnia  polyethylene glycol 3350 oral powder for reconstitution: 17 gram(s) orally once a day As needed Constipation  traMADol 50 mg oral tablet: 0.5 tab(s) orally every 6 hours As needed moderate and severe pain

## 2024-03-26 NOTE — PROGRESS NOTE ADULT - TIME BILLING
- Ordering, reviewing, and/or interpreting labs, testing, and/or imaging  - Independently obtaining a review of systems and performing a physical exam  - Reviewing prior records and where necessary, outpatient records  - Counselling and educating patient and/or family regarding interpretation of aforementioned items and plan of care
- Ordering, reviewing, and/or interpreting labs, testing, and/or imaging  - Independently obtaining a review of systems and performing a physical exam  - Reviewing prior records and where necessary, outpatient records  - Counselling and educating patient and/or family regarding interpretation of aforementioned items and plan of care
time spent reviewing prior charts, meds, discussing plan with patient, consultants=51 minutes
time spent reviewing prior charts, meds, discussing plan with patient, family, PT= 53 minutes

## 2024-03-26 NOTE — DISCHARGE NOTE PROVIDER - CARE PROVIDER_API CALL
Francoise Lamb  Cardiology  [id number] [address] [place] [address], [address]  [place] [address]  Phone: [phone]  Fax: [phone]  Follow Up Time:

## 2024-03-26 NOTE — DISCHARGE NOTE PROVIDER - HOSPITAL COURSE
89F admitted for low back pain, fall, and thigh pain.        Problem/Plan - 1:  ·  Problem: L4 vertebral fracture.   ·  Plan: -confirmed on MRI lumbar, NSGY to review MRI  -TLSO brace delivered, appreciate PT eval rec CLAIRE  -pain control w/ acetaminophen and tramadol, encourage OOB.     Problem/Plan - 2:  ·  Problem: Right thigh pain.   ·  Plan: -CT showed possible L4 fx.   -MR lumbar showed acute mild wedge compression fracture at L4 with multiple fracture lines, possible arachnoid cyst or synovial facet joint cyst, mod-severs spinal canal stenosis at mid L5 vertebral body  -pain control w/ acetaminophen and tramadol, lidocaine patch. HPI:  89F PMHx questionable Parkinson, currently not on meds.   Presenting w/ worsening RLE pain and difficulty ambulating. Pt had multiple falls over the last week 2/2 low back pain. After falling, she's had R thigh pain on movement as well.   Denies fever, chills, cp, sob, cough, palpitation, abd pain, n/v/d, or significant change in bowel/urinary habits. Of note, pt denied dizziness to me today. She does have h/o vertigo, and was referred to vestibular rehab last year.     In the ED, VSS.   CBC w/ wbc 10.8, hgb 12.4, plt 353   CMP w/ Na 132, BUN 27, SCr 0.58, alk phos 151.   XR of R knee, femur and pelvis neg for fx.   CT lumbar showed narrowing at L2 and recent L4 fx with air tracking.   Seen by NSGY in the ED, no acute surg intervention recommended.  (22 Mar 2024 03:19)    Hospital Course:  89F admitted for low back pain, fall, and thigh pain, found to have L4 vertebral fracture, confirmed on MRI lumbar, seen by neurosurgery, no surgical intervention recommended,   TLSO brace ordered and delivered to be used with ambulation. PT eval rec CLAIRE to continue pain control w/ acetaminophen and tramadol, encourage OOB. Stable for discharge    Important Medication Changes and Reason:  Lidocaine and Tramadol for pain     Active or Pending Issues Requiring Follow-up:  PMD in 1-2 weeks of discharge from rehab     Advanced Directives:   [ X] Full code  [ ] DNR  [ ] Hospice    Discharge Diagnoses:  L4 fracture          HPI:  89F PMHx questionable Parkinson, currently not on meds.   Presenting w/ worsening RLE pain and difficulty ambulating. Pt had multiple falls over the last week 2/2 low back pain. After falling, she's had R thigh pain on movement as well.   Denies fever, chills, cp, sob, cough, palpitation, abd pain, n/v/d, or significant change in bowel/urinary habits. Of note, pt denied dizziness to me today. She does have h/o vertigo, and was referred to vestibular rehab last year.     In the ED, VSS.   CBC w/ wbc 10.8, hgb 12.4, plt 353   CMP w/ Na 132, BUN 27, SCr 0.58, alk phos 151.   XR of R knee, femur and pelvis neg for fx.   CT lumbar showed narrowing at L2 and recent L4 fx with air tracking.   Seen by NSGY in the ED, no acute surg intervention recommended.  (22 Mar 2024 03:19)    Hospital Course:  89F admitted for low back pain, fall, and thigh pain, found to have L4 vertebral fracture, confirmed on MRI lumbar, seen by neurosurgery, no surgical intervention recommended,   TLSO brace ordered and delivered to be used with ambulation. PT eval rec CLAIRE to continue pain control w/ acetaminophen and tramadol, encourage OOB. Stable for discharge    Important Medication Changes and Reason:  Lidocaine and Tramadol for pain     Active or Pending Issues Requiring Follow-up:  PMD in 1-2 weeks of discharge from rehab   Neurology follow up    Advanced Directives:   [ X] Full code  [ ] DNR  [ ] Hospice    Discharge Diagnoses:  L4 fracture

## 2024-03-27 ENCOUNTER — TRANSCRIPTION ENCOUNTER (OUTPATIENT)
Age: 89
End: 2024-03-27

## 2024-03-27 VITALS
RESPIRATION RATE: 18 BRPM | DIASTOLIC BLOOD PRESSURE: 71 MMHG | SYSTOLIC BLOOD PRESSURE: 135 MMHG | TEMPERATURE: 99 F | OXYGEN SATURATION: 99 % | HEART RATE: 77 BPM

## 2024-03-27 PROCEDURE — 73564 X-RAY EXAM KNEE 4 OR MORE: CPT

## 2024-03-27 PROCEDURE — 85027 COMPLETE CBC AUTOMATED: CPT

## 2024-03-27 PROCEDURE — 97530 THERAPEUTIC ACTIVITIES: CPT

## 2024-03-27 PROCEDURE — 84100 ASSAY OF PHOSPHORUS: CPT

## 2024-03-27 PROCEDURE — 97116 GAIT TRAINING THERAPY: CPT

## 2024-03-27 PROCEDURE — 80053 COMPREHEN METABOLIC PANEL: CPT

## 2024-03-27 PROCEDURE — 99239 HOSP IP/OBS DSCHRG MGMT >30: CPT

## 2024-03-27 PROCEDURE — 73552 X-RAY EXAM OF FEMUR 2/>: CPT

## 2024-03-27 PROCEDURE — 72148 MRI LUMBAR SPINE W/O DYE: CPT | Mod: MC

## 2024-03-27 PROCEDURE — 72131 CT LUMBAR SPINE W/O DYE: CPT | Mod: MC

## 2024-03-27 PROCEDURE — 85025 COMPLETE CBC W/AUTO DIFF WBC: CPT

## 2024-03-27 PROCEDURE — 83735 ASSAY OF MAGNESIUM: CPT

## 2024-03-27 PROCEDURE — 71045 X-RAY EXAM CHEST 1 VIEW: CPT

## 2024-03-27 PROCEDURE — 99285 EMERGENCY DEPT VISIT HI MDM: CPT

## 2024-03-27 PROCEDURE — 73502 X-RAY EXAM HIP UNI 2-3 VIEWS: CPT

## 2024-03-27 PROCEDURE — 97161 PT EVAL LOW COMPLEX 20 MIN: CPT

## 2024-03-27 PROCEDURE — 80048 BASIC METABOLIC PNL TOTAL CA: CPT

## 2024-03-27 RX ORDER — POLYETHYLENE GLYCOL 3350 17 G/17G
17 POWDER, FOR SOLUTION ORAL
Qty: 0 | Refills: 0 | DISCHARGE
Start: 2024-03-27

## 2024-03-27 RX ORDER — ACETAMINOPHEN 500 MG
3 TABLET ORAL
Qty: 0 | Refills: 0 | DISCHARGE
Start: 2024-03-27

## 2024-03-27 RX ORDER — TRAMADOL HYDROCHLORIDE 50 MG/1
0.5 TABLET ORAL
Qty: 0 | Refills: 0 | DISCHARGE
Start: 2024-03-27

## 2024-03-27 RX ORDER — LIDOCAINE 4 G/100G
1 CREAM TOPICAL
Qty: 0 | Refills: 0 | DISCHARGE
Start: 2024-03-27

## 2024-03-27 RX ORDER — LANOLIN ALCOHOL/MO/W.PET/CERES
1 CREAM (GRAM) TOPICAL
Qty: 0 | Refills: 0 | DISCHARGE
Start: 2024-03-27

## 2024-03-27 RX ADMIN — Medication 975 MILLIGRAM(S): at 12:05

## 2024-03-27 RX ADMIN — Medication 975 MILLIGRAM(S): at 11:05

## 2024-03-27 RX ADMIN — LIDOCAINE 1 PATCH: 4 CREAM TOPICAL at 12:56

## 2024-03-27 RX ADMIN — LIDOCAINE 1 PATCH: 4 CREAM TOPICAL at 01:40

## 2024-03-27 NOTE — PROGRESS NOTE ADULT - PROBLEM SELECTOR PLAN 3
- resolved with IVF  - likely due to poor PO intake

## 2024-03-27 NOTE — DISCHARGE NOTE NURSING/CASE MANAGEMENT/SOCIAL WORK - PATIENT PORTAL LINK FT
You can access the FollowMyHealth Patient Portal offered by [facility] by registering at the following website: [email or web address]. By joining [employer]’s FollowMyHealth portal, you will also be able to view your health information using other applications (apps) compatible with our system.

## 2024-03-27 NOTE — PROGRESS NOTE ADULT - PROBLEM SELECTOR PLAN 1
-confirmed on MRI lumbar, appreciate NSGY review, no surgical intervention  -TLSO brace delivered, appreciate PT eval rec CLAIRE  -pain control w/ acetaminophen and tramadol, encourage OOB
-confirmed on MRI lumbar  -PT ater TLSO brace  -f/u neurosx for updated recs s/p MR  -pain control w/ acetaminophen and ibuprofen
-f/u MRI lumbar  -Will need TLSO brace if fracture on MR prior to PT  -pain control w/ acetaminophen and ibuprofen
-confirmed on MRI lumbar, appreciate NSGY review, no surgical intervention  -TLSO brace delivered, appreciate PT eval rec CLAIRE  -pain control w/ acetaminophen and tramadol, encourage OOB
-confirmed on MRI lumbar, NSGY to review MRI  -TLSO brace delivered, appreciate PT eval rec CLAIRE  -pain control w/ acetaminophen and tramadol, encourage OOB

## 2024-03-27 NOTE — DISCHARGE NOTE NURSING/CASE MANAGEMENT/SOCIAL WORK - NSDCVIVACCINE_GEN_ALL_CORE_FT
Tdap; 06-Dec-2023 12:06; Gayle Mejias (RN); [employer]; [id number] (Exp. Date: 20-Jul-2025); IntraMuscular; Deltoid Left.; 0.5 milliLiter(s); VIS (VIS Published: 20-Jun-2025, VIS Presented: 06-Dec-2023);

## 2024-03-27 NOTE — PROGRESS NOTE ADULT - PROBLEM SELECTOR PLAN 2
-CT showed possible L4 fx.   -MR lumbar showed acute mild wedge compression fracture at L4 with multiple fracture lines, possible arachnoid cyst or synovial facet joint cyst, mod-severs spinal canal stenosis at mid L5 vertebral body  -pain control w/ acetaminophen and tramadol, lidocaine patch
-CT showed possible L4 fx. f/u MR lumbar  -pain control w/ acetaminophen and ibuprofen, lidocaine patch
-CT showed possible L4 fx. f/u MR lumbar  -pain control w/ acetaminophen and ibuprofen, lidocaine patch

## 2024-03-27 NOTE — PROGRESS NOTE ADULT - SUBJECTIVE AND OBJECTIVE BOX
Norma Kaplan MD  Division of Hospital Medicine  Reachable on MS Teams    PROGRESS NOTE:     Patient is a 89y old  Female who presents with a chief complaint of difficulty ambulating    SUBJECTIVE / OVERNIGHT EVENTS: No acute events overnight, seen this AM. Resting in bed, states pain is controlled when she is not moving, Encouraged OOB with pre-medication.     ADDITIONAL REVIEW OF SYSTEMS:    MEDICATIONS  (STANDING):  lidocaine   4% Patch 1 Patch Transdermal every 24 hours    MEDICATIONS  (PRN):  acetaminophen     Tablet .. 975 milliGRAM(s) Oral every 8 hours PRN Mild Pain (1 - 3)  aluminum hydroxide/magnesium hydroxide/simethicone Suspension 30 milliLiter(s) Oral every 4 hours PRN Dyspepsia  melatonin 3 milliGRAM(s) Oral at bedtime PRN Insomnia  ondansetron Injectable 4 milliGRAM(s) IV Push every 8 hours PRN Nausea and/or Vomiting  traMADol 25 milliGRAM(s) Oral every 6 hours PRN moderate and severe pain      CAPILLARY BLOOD GLUCOSE        I&O's Summary      PHYSICAL EXAM:  Vital Signs Last 24 Hrs  T(C): 36.4 (25 Mar 2024 08:39), Max: 36.4 (24 Mar 2024 21:10)  T(F): 97.5 (25 Mar 2024 08:39), Max: 97.6 (24 Mar 2024 21:10)  HR: 63 (25 Mar 2024 08:39) (63 - 67)  BP: 153/70 (25 Mar 2024 08:39) (149/74 - 153/70)  BP(mean): --  RR: 18 (25 Mar 2024 08:39) (18 - 18)  SpO2: 98% (25 Mar 2024 08:39) (96% - 98%)    Parameters below as of 25 Mar 2024 08:39  Patient On (Oxygen Delivery Method): room air        CONSTITUTIONAL: NAD, well-developed  RESPIRATORY: Normal respiratory effort; lungs are clear to auscultation bilaterally  CARDIOVASCULAR: Regular rate and rhythm, normal S1 and S2, no murmur/rub/gallop; No lower extremity edema; Peripheral pulses are 2+ bilaterally  ABDOMEN: Nontender to palpation, normoactive bowel sounds, no rebound/guarding; No hepatosplenomegaly  MUSCLOSKELETAL: R hip bruising with underlying mild tenderness.   PSYCH: A+O to person, place, and time; affect anxious but appropriate    LABS:                      RADIOLOGY & ADDITIONAL TESTS:  Results Reviewed:   Imaging Personally Reviewed:  Electrocardiogram Personally Reviewed:    COORDINATION OF CARE:  Care Discussed with Consultants/Other Providers [Y/N]: NSGY  Prior or Outpatient Records Reviewed [Y/N]:  
SUBJECTIVE / OVERNIGHT EVENTS:  Today is hospital day 2d. There are no new issues or overnight events.   Did not endorse any headache, lightheadedness, vertigo, shortness of breathe, cough, chest pain, palpitations, tachycardia, abdominal pain, nausea, vomiting, diarrhea or constipation currently    HPI:  89F PMHx questionable Parkinson, currently not on meds.   Presenting w/ worsening RLE pain and difficulty ambulating. Pt had multiple falls over the last week 2/2 low back pain. After falling, she's had R thigh pain on movement as well.   Denies fever, chills, cp, sob, cough, palpitation, abd pain, n/v/d, or significant change in bowel/urinary habits. Of note, pt denied dizziness to me today. She does have h/o vertigo, and was referred to vestibular rehab last year.     In the ED, VSS.   CBC w/ wbc 10.8, hgb 12.4, plt 353   CMP w/ Na 132, BUN 27, SCr 0.58, alk phos 151.   XR of R knee, femur and pelvis neg for fx.   CT lumbar showed narrowing at L2 and recent L4 fx with air tracking.   Seen by NSGY in the ED, no acute surg intervention recommended.  (22 Mar 2024 03:19)    MEDICATIONS  (STANDING):  acetaminophen     Tablet .. 1000 milliGRAM(s) Oral every 8 hours  lidocaine   4% Patch 1 Patch Transdermal every 24 hours    MEDICATIONS  (PRN):  aluminum hydroxide/magnesium hydroxide/simethicone Suspension 30 milliLiter(s) Oral every 4 hours PRN Dyspepsia  melatonin 3 milliGRAM(s) Oral at bedtime PRN Insomnia  ondansetron Injectable 4 milliGRAM(s) IV Push every 8 hours PRN Nausea and/or Vomiting  traMADol 25 milliGRAM(s) Oral every 6 hours PRN moderate and severe pain    HOME MEDICATIONS:    PHYSICAL EXAM  Vital Signs Last 24 Hrs  T(C): 36.4 (23 Mar 2024 13:27), Max: 36.8 (22 Mar 2024 16:52)  T(F): 97.5 (23 Mar 2024 13:27), Max: 98.3 (22 Mar 2024 16:52)  HR: 71 (23 Mar 2024 13:27) (65 - 72)  BP: 135/70 (23 Mar 2024 13:27) (115/62 - 170/76)  BP(mean): --  RR: 18 (23 Mar 2024 13:27) (18 - 18)  SpO2: 97% (23 Mar 2024 13:27) (97% - 99%)    Parameters below as of 23 Mar 2024 13:27  Patient On (Oxygen Delivery Method): room air        03-22-24 @ 07:01  -  03-23-24 @ 07:00  --------------------------------------------------------  IN: 0 mL / OUT: 952 mL / NET: -952 mL    03-23-24 @ 07:01  -  03-23-24 @ 16:33  --------------------------------------------------------  IN: 0 mL / OUT: 350 mL / NET: -350 mL      CONSTITUTIONAL: Well-groomed, in no apparent distress;  EYES: No conjunctival or scleral injection, non-icteric;  ENMT: No external nasal lesions; Normal outer ears;  NECK: Trachea midline;  RESPIRATORY: Normal respiratory effort; Decreased breathe sounds bilaterally without wheeze/rhonchi/rales;  CARDIOVASCULAR: Regular rate and rhythm;  GASTROINTESTINAL: Non-distended; No palpable masses; No rebound/guarding;  EXTREMITIES:  No lower extremity edema;  NEUROLOGY: A+O to person, place, and time; Does respond to commands appropriately;  PSYCHIATRY: Mood and Affect appropriate    LABS:                        12.0   7.79  )-----------( 335      ( 23 Mar 2024 06:33 )             35.8     03-23    138  |  104  |  23  ----------------------------<  78  4.5   |  22  |  0.66    Ca    9.6      23 Mar 2024 06:33  Phos  3.6     03-22  Mg     2.6     03-22    TPro  6.4  /  Alb  3.8  /  TBili  0.4  /  DBili  x   /  AST  18  /  ALT  14  /  AlkPhos  131<H>  03-22          Urinalysis Basic - ( 23 Mar 2024 06:33 )    Color: x / Appearance: x / SG: x / pH: x  Gluc: 78 mg/dL / Ketone: x  / Bili: x / Urobili: x   Blood: x / Protein: x / Nitrite: x   Leuk Esterase: x / RBC: x / WBC x   Sq Epi: x / Non Sq Epi: x / Bacteria: x            RADIOLOGY & ADDITIONAL TESTS:  EKG  12 Lead ECG:   Ventricular Rate 74 BPM    Atrial Rate 74 BPM    P-R Interval 192 ms    QRS Duration 120 ms    Q-T Interval 408 ms    QTC Calculation(Bazett) 452 ms    P Axis 94 degrees    R Axis -2 degrees    T Axis 62 degrees    Diagnosis Line NORMAL SINUS RHYTHM  LEFT BUNDLE BRANCH BLOCK  ABNORMAL ECG  WHEN COMPARED WITH ECG OF 06-DEC-2023 12:46,  no  SIGNIFICANT CHANGES HAVE OCCURRED  Confirmed by ARCHIE CHAKRABORTY MD ([id number]) on 3/22/2024 9:00:15 PM (03-21-24 @ 20:20)  12 Lead ECG:   Ventricular Rate 78 BPM    Atrial Rate 78 BPM    P-R Interval 156 ms    QRS Duration 126 ms    Q-T Interval 400 ms    QTC Calculation(Bazett) 456 ms    P Axis 85 degrees    R Axis -43 degrees    T Axis 80 degrees    Diagnosis Line NORMAL SINUS RHYTHM  LEFT AXIS DEVIATION  LEFT BUNDLE BRANCH BLOCK  ABNORMAL ECG  WHEN COMPARED WITH ECG OF 09-AUG-2016 23:08,  SIGNIFICANT CHANGES HAVE OCCURRED  Confirmed by MD BHARATHI, LIZBETH ([id number]) on 12/7/2023 9:51:36 PM (12-06-23 @ 12:46)    
SUBJECTIVE / OVERNIGHT EVENTS:  Today is hospital day 3d. There are no new issues or overnight events.   Did not endorse any headache, lightheadedness, vertigo, shortness of breathe, cough, chest pain, palpitations, tachycardia, abdominal pain, nausea, vomiting, diarrhea or constipation currently    HPI:  89F PMHx questionable Parkinson, currently not on meds.   Presenting w/ worsening RLE pain and difficulty ambulating. Pt had multiple falls over the last week 2/2 low back pain. After falling, she's had R thigh pain on movement as well.   Denies fever, chills, cp, sob, cough, palpitation, abd pain, n/v/d, or significant change in bowel/urinary habits. Of note, pt denied dizziness to me today. She does have h/o vertigo, and was referred to vestibular rehab last year.     In the ED, VSS.   CBC w/ wbc 10.8, hgb 12.4, plt 353   CMP w/ Na 132, BUN 27, SCr 0.58, alk phos 151.   XR of R knee, femur and pelvis neg for fx.   CT lumbar showed narrowing at L2 and recent L4 fx with air tracking.   Seen by NSGY in the ED, no acute surg intervention recommended.  (22 Mar 2024 03:19)    MEDICATIONS  (STANDING):  acetaminophen     Tablet .. 1000 milliGRAM(s) Oral every 8 hours  lidocaine   4% Patch 1 Patch Transdermal every 24 hours    MEDICATIONS  (PRN):  aluminum hydroxide/magnesium hydroxide/simethicone Suspension 30 milliLiter(s) Oral every 4 hours PRN Dyspepsia  melatonin 3 milliGRAM(s) Oral at bedtime PRN Insomnia  ondansetron Injectable 4 milliGRAM(s) IV Push every 8 hours PRN Nausea and/or Vomiting  traMADol 25 milliGRAM(s) Oral every 6 hours PRN moderate and severe pain    HOME MEDICATIONS:  LSO Brace: Height: 157.5cm (5.17ft)  Weight: 49.9kg (110lbs)  Dx: S32. 040A: Wedge compression fracture of fourth lumbar vertebra, initial encounter for closed fracture    PHYSICAL EXAM  Vital Signs Last 24 Hrs  T(C): 36.8 (24 Mar 2024 13:08), Max: 36.8 (24 Mar 2024 13:08)  T(F): 98.3 (24 Mar 2024 13:08), Max: 98.3 (24 Mar 2024 13:08)  HR: 71 (24 Mar 2024 13:08) (59 - 71)  BP: 132/63 (24 Mar 2024 13:08) (118/64 - 136/69)  BP(mean): --  RR: 18 (24 Mar 2024 13:08) (18 - 18)  SpO2: 95% (24 Mar 2024 13:08) (95% - 98%)    Parameters below as of 24 Mar 2024 13:08  Patient On (Oxygen Delivery Method): room air        03-23-24 @ 07:01  -  03-24-24 @ 07:00  --------------------------------------------------------  IN: 175 mL / OUT: 350 mL / NET: -175 mL      CONSTITUTIONAL: Well-groomed, in no apparent distress;  EYES: No conjunctival or scleral injection, non-icteric;  ENMT: No external nasal lesions; Normal outer ears;  NECK: Trachea midline;  RESPIRATORY: Normal respiratory effort; Decreased breathe sounds bilaterally without wheeze/rhonchi/rales;  CARDIOVASCULAR: Regular rate and rhythm;  GASTROINTESTINAL: Non-distended; No palpable masses; No rebound/guarding;  EXTREMITIES:  No lower extremity edema;  NEUROLOGY: A+O to person, place, and time; Does respond to commands appropriately;  PSYCHIATRY: Mood and Affect appropriate    LABS:                        12.0   7.79  )-----------( 335      ( 23 Mar 2024 06:33 )             35.8     03-23    138  |  104  |  23  ----------------------------<  78  4.5   |  22  |  0.66    Ca    9.6      23 Mar 2024 06:33            Urinalysis Basic - ( 23 Mar 2024 06:33 )    Color: x / Appearance: x / SG: x / pH: x  Gluc: 78 mg/dL / Ketone: x  / Bili: x / Urobili: x   Blood: x / Protein: x / Nitrite: x   Leuk Esterase: x / RBC: x / WBC x   Sq Epi: x / Non Sq Epi: x / Bacteria: x            RADIOLOGY & ADDITIONAL TESTS:  EKG  12 Lead ECG:   Ventricular Rate 74 BPM    Atrial Rate 74 BPM    P-R Interval 192 ms    QRS Duration 120 ms    Q-T Interval 408 ms    QTC Calculation(Bazett) 452 ms    P Axis 94 degrees    R Axis -2 degrees    T Axis 62 degrees    Diagnosis Line NORMAL SINUS RHYTHM  LEFT BUNDLE BRANCH BLOCK  ABNORMAL ECG  WHEN COMPARED WITH ECG OF 06-DEC-2023 12:46,  no  SIGNIFICANT CHANGES HAVE OCCURRED  Confirmed by ARCHIE CHAKRABORTY MD ([id number]) on 3/22/2024 9:00:15 PM (03-21-24 @ 20:20)  12 Lead ECG:   Ventricular Rate 78 BPM    Atrial Rate 78 BPM    P-R Interval 156 ms    QRS Duration 126 ms    Q-T Interval 400 ms    QTC Calculation(Bazett) 456 ms    P Axis 85 degrees    R Axis -43 degrees    T Axis 80 degrees    Diagnosis Line NORMAL SINUS RHYTHM  LEFT AXIS DEVIATION  LEFT BUNDLE BRANCH BLOCK  ABNORMAL ECG  WHEN COMPARED WITH ECG OF 09-AUG-2016 23:08,  SIGNIFICANT CHANGES HAVE OCCURRED  Confirmed by MD BHARATHI, LIZBETH ([id number]) on 12/7/2023 9:51:36 PM (12-06-23 @ 12:46)    
Norma Kaplan MD  Division of Hospital Medicine  Reachable on MS Teams    PROGRESS NOTE:     Patient is a 89y old  Female who presents with a chief complaint of difficulty ambulating (25 Mar 2024 16:51)      SUBJECTIVE / OVERNIGHT EVENTS: No acute events overnight, seen this AM. Resting in bed, was able to tolerate being in chair yesterday. Encouraged PRN pain medications     ADDITIONAL REVIEW OF SYSTEMS:    MEDICATIONS  (STANDING):  lidocaine   4% Patch 1 Patch Transdermal every 24 hours    MEDICATIONS  (PRN):  acetaminophen     Tablet .. 975 milliGRAM(s) Oral every 8 hours PRN Mild Pain (1 - 3)  aluminum hydroxide/magnesium hydroxide/simethicone Suspension 30 milliLiter(s) Oral every 4 hours PRN Dyspepsia  melatonin 3 milliGRAM(s) Oral at bedtime PRN Insomnia  ondansetron Injectable 4 milliGRAM(s) IV Push every 8 hours PRN Nausea and/or Vomiting  polyethylene glycol 3350 17 Gram(s) Oral daily PRN Constipation  traMADol 25 milliGRAM(s) Oral every 6 hours PRN moderate and severe pain      CAPILLARY BLOOD GLUCOSE        I&O's Summary    25 Mar 2024 07:01  -  26 Mar 2024 07:00  --------------------------------------------------------  IN: 0 mL / OUT: 950 mL / NET: -950 mL        PHYSICAL EXAM:  Vital Signs Last 24 Hrs  T(C): 36.7 (26 Mar 2024 07:42), Max: 36.8 (26 Mar 2024 00:26)  T(F): 98 (26 Mar 2024 07:42), Max: 98.2 (26 Mar 2024 00:26)  HR: 62 (26 Mar 2024 07:42) (62 - 67)  BP: 146/73 (26 Mar 2024 07:42) (109/63 - 161/66)  BP(mean): --  RR: 18 (26 Mar 2024 07:42) (18 - 18)  SpO2: 96% (26 Mar 2024 07:42) (95% - 99%)    Parameters below as of 26 Mar 2024 07:42  Patient On (Oxygen Delivery Method): room air      CONSTITUTIONAL: NAD, well-developed  RESPIRATORY: Normal respiratory effort; lungs are clear to auscultation bilaterally  CARDIOVASCULAR: Regular rate and rhythm, normal S1 and S2, no murmur/rub/gallop; No lower extremity edema; Peripheral pulses are 2+ bilaterally  ABDOMEN: Nontender to palpation, normoactive bowel sounds, no rebound/guarding; No hepatosplenomegaly  MUSCLOSKELETAL: R hip bruising with underlying mild tenderness.   PSYCH: A+O to person, place, and time; affect anxious but appropriate      LABS:                      RADIOLOGY & ADDITIONAL TESTS:  Results Reviewed:   Imaging Personally Reviewed:  Electrocardiogram Personally Reviewed:    COORDINATION OF CARE:  Care Discussed with Consultants/Other Providers [Y/N]:  Prior or Outpatient Records Reviewed [Y/N]:  
Norma Kaplan MD  Division of Hospital Medicine  Reachable on MS Teams    PROGRESS NOTE:     Patient is a 89y old  Female who presents with a chief complaint of Back pain  (26 Mar 2024 14:15)      SUBJECTIVE / OVERNIGHT EVENTS: No acute events overnight, seen this AM. Amenable to [facility], no complaints, encouraged utilization of pain medications    ADDITIONAL REVIEW OF SYSTEMS:    MEDICATIONS  (STANDING):  lidocaine   4% Patch 1 Patch Transdermal every 24 hours    MEDICATIONS  (PRN):  acetaminophen     Tablet .. 975 milliGRAM(s) Oral every 8 hours PRN Mild Pain (1 - 3)  aluminum hydroxide/magnesium hydroxide/simethicone Suspension 30 milliLiter(s) Oral every 4 hours PRN Dyspepsia  melatonin 3 milliGRAM(s) Oral at bedtime PRN Insomnia  ondansetron Injectable 4 milliGRAM(s) IV Push every 8 hours PRN Nausea and/or Vomiting  polyethylene glycol 3350 17 Gram(s) Oral daily PRN Constipation  traMADol 25 milliGRAM(s) Oral every 6 hours PRN moderate and severe pain      CAPILLARY BLOOD GLUCOSE        I&O's Summary      PHYSICAL EXAM:  Vital Signs Last 24 Hrs  T(C): 37.2 (27 Mar 2024 13:12), Max: 37.2 (27 Mar 2024 13:12)  T(F): 99 (27 Mar 2024 13:12), Max: 99 (27 Mar 2024 13:12)  HR: 77 (27 Mar 2024 13:12) (62 - 77)  BP: 135/71 (27 Mar 2024 13:12) (135/71 - 151/73)  BP(mean): --  RR: 18 (27 Mar 2024 13:12) (18 - 18)  SpO2: 99% (27 Mar 2024 13:12) (95% - 99%)    Parameters below as of 27 Mar 2024 13:12  Patient On (Oxygen Delivery Method): room air      CONSTITUTIONAL: NAD, well-developed  RESPIRATORY: Normal respiratory effort; lungs are clear to auscultation bilaterally  CARDIOVASCULAR: Regular rate and rhythm, normal S1 and S2, no murmur/rub/gallop; No lower extremity edema; Peripheral pulses are 2+ bilaterally  ABDOMEN: Nontender to palpation, normoactive bowel sounds, no rebound/guarding; No hepatosplenomegaly  MUSCLOSKELETAL: R hip bruising with underlying mild tenderness.   PSYCH: A+O to person, place, and time; affect anxious but appropriate    LABS:                      RADIOLOGY & ADDITIONAL TESTS:  Results Reviewed:   Imaging Personally Reviewed:  Electrocardiogram Personally Reviewed:    COORDINATION OF CARE:  Care Discussed with Consultants/Other Providers [Y/N]:  Prior or Outpatient Records Reviewed [Y/N]:

## 2024-03-27 NOTE — PROGRESS NOTE ADULT - PROBLEM SELECTOR PROBLEM 1
L4 vertebral fracture

## 2024-03-27 NOTE — PROGRESS NOTE ADULT - ASSESSMENT
89F admitted for low back pain, fall, and thigh pain. 

## 2024-03-27 NOTE — PROGRESS NOTE ADULT - NSPROGADDITIONALINFOA_GEN_ALL_CORE
Plan d/w ACP ANALIA Frias. Pt stated she will update her family herself
pend - MR lumbar, PT, possible need for TLSO brace
pend - PT, TLSO brace
Plan d/w ACP Fernando, son David updated, discussed that patient cannot navigate stairs so [facility] is safer dc option. Family in agreement
Plan d/w NILDA Victoria and AUGIE Noland. Total time discharge planning 33 minutes

## 2024-05-07 PROBLEM — R42 DIZZINESS AND GIDDINESS: Chronic | Status: ACTIVE | Noted: 2024-03-22

## 2024-05-07 PROBLEM — Z86.69 PERSONAL HISTORY OF OTHER DISEASES OF THE NERVOUS SYSTEM AND SENSE ORGANS: Chronic | Status: ACTIVE | Noted: 2024-03-22

## 2024-10-01 ENCOUNTER — APPOINTMENT (OUTPATIENT)
Dept: GERIATRICS | Facility: CLINIC | Age: 89
End: 2024-10-01

## 2024-11-04 NOTE — PATIENT PROFILE ADULT - NSPROPOAPRESSUREINJURY_GEN_A_NUR
no [Postmenopausal] : postmenopausal [FreeTextEntry1] : Still having hot flashes but much less. Gets one at nighttime and then once during the night (previously every hour). Resolves on its own.

## 2024-11-20 ENCOUNTER — APPOINTMENT (OUTPATIENT)
Dept: NEUROLOGY | Facility: CLINIC | Age: 89
End: 2024-11-20
Payer: MEDICARE

## 2024-11-20 VITALS
WEIGHT: 98 LBS | HEIGHT: 63 IN | HEART RATE: 74 BPM | SYSTOLIC BLOOD PRESSURE: 165 MMHG | DIASTOLIC BLOOD PRESSURE: 85 MMHG | BODY MASS INDEX: 17.36 KG/M2

## 2024-11-20 VITALS — SYSTOLIC BLOOD PRESSURE: 155 MMHG | HEART RATE: 71 BPM | DIASTOLIC BLOOD PRESSURE: 78 MMHG

## 2024-11-20 DIAGNOSIS — G20.C PARKINSONISM, UNSPECIFIED: ICD-10-CM

## 2024-11-20 PROCEDURE — G2211 COMPLEX E/M VISIT ADD ON: CPT

## 2024-11-20 PROCEDURE — 99205 OFFICE O/P NEW HI 60 MIN: CPT

## 2024-11-20 RX ORDER — CARBIDOPA AND LEVODOPA 25; 100 MG/1; MG/1
25-100 TABLET, EXTENDED RELEASE ORAL
Qty: 90 | Refills: 5 | Status: ACTIVE | COMMUNITY
Start: 2024-11-20 | End: 1900-01-01

## 2024-11-27 RX ORDER — CARBIDOPA 25 MG/1
25 TABLET ORAL
Qty: 90 | Refills: 3 | Status: ACTIVE | COMMUNITY
Start: 2024-11-27 | End: 1900-01-01

## 2025-02-05 ENCOUNTER — APPOINTMENT (OUTPATIENT)
Dept: NEUROLOGY | Facility: CLINIC | Age: 89
End: 2025-02-05

## 2025-03-07 ENCOUNTER — APPOINTMENT (OUTPATIENT)
Dept: MRI IMAGING | Facility: CLINIC | Age: 89
End: 2025-03-07

## 2025-03-07 ENCOUNTER — OUTPATIENT (OUTPATIENT)
Dept: OUTPATIENT SERVICES | Facility: HOSPITAL | Age: 89
LOS: 1 days | End: 2025-03-07
Payer: MEDICARE

## 2025-03-07 DIAGNOSIS — Z90.89 ACQUIRED ABSENCE OF OTHER ORGANS: Chronic | ICD-10-CM

## 2025-03-07 DIAGNOSIS — G20.C PARKINSONISM, UNSPECIFIED: ICD-10-CM

## 2025-03-07 PROCEDURE — 70551 MRI BRAIN STEM W/O DYE: CPT

## 2025-03-07 PROCEDURE — 70547 MR ANGIOGRAPHY NECK W/O DYE: CPT

## 2025-03-07 PROCEDURE — 70551 MRI BRAIN STEM W/O DYE: CPT | Mod: 26

## 2025-03-07 PROCEDURE — 70547 MR ANGIOGRAPHY NECK W/O DYE: CPT | Mod: 26

## 2025-03-07 PROCEDURE — 70544 MR ANGIOGRAPHY HEAD W/O DYE: CPT | Mod: 26,XU

## 2025-03-07 PROCEDURE — 70544 MR ANGIOGRAPHY HEAD W/O DYE: CPT

## 2025-05-01 ENCOUNTER — APPOINTMENT (OUTPATIENT)
Dept: GERIATRICS | Facility: CLINIC | Age: 89
End: 2025-05-01
Payer: MEDICARE

## 2025-05-01 ENCOUNTER — NON-APPOINTMENT (OUTPATIENT)
Age: 89
End: 2025-05-01

## 2025-05-01 VITALS
HEART RATE: 68 BPM | SYSTOLIC BLOOD PRESSURE: 171 MMHG | RESPIRATION RATE: 14 BRPM | HEIGHT: 63 IN | OXYGEN SATURATION: 99 % | DIASTOLIC BLOOD PRESSURE: 64 MMHG | BODY MASS INDEX: 17.03 KG/M2 | TEMPERATURE: 97.2 F | WEIGHT: 96.13 LBS

## 2025-05-01 VITALS — DIASTOLIC BLOOD PRESSURE: 62 MMHG | SYSTOLIC BLOOD PRESSURE: 132 MMHG

## 2025-05-01 DIAGNOSIS — Z23 ENCOUNTER FOR IMMUNIZATION: ICD-10-CM

## 2025-05-01 DIAGNOSIS — R26.89 OTHER ABNORMALITIES OF GAIT AND MOBILITY: ICD-10-CM

## 2025-05-01 DIAGNOSIS — R42 DIZZINESS AND GIDDINESS: ICD-10-CM

## 2025-05-01 DIAGNOSIS — I67.9 CEREBROVASCULAR DISEASE, UNSPECIFIED: ICD-10-CM

## 2025-05-01 DIAGNOSIS — I10 ESSENTIAL (PRIMARY) HYPERTENSION: ICD-10-CM

## 2025-05-01 DIAGNOSIS — G20.C PARKINSONISM, UNSPECIFIED: ICD-10-CM

## 2025-05-01 DIAGNOSIS — Z00.00 ENCOUNTER FOR GENERAL ADULT MEDICAL EXAMINATION W/OUT ABNORMAL FINDINGS: ICD-10-CM

## 2025-05-01 DIAGNOSIS — Z87.898 PERSONAL HISTORY OF OTHER SPECIFIED CONDITIONS: ICD-10-CM

## 2025-05-01 DIAGNOSIS — Z91.81 HISTORY OF FALLING: ICD-10-CM

## 2025-05-01 PROCEDURE — 99215 OFFICE O/P EST HI 40 MIN: CPT

## 2025-05-02 LAB
ALBUMIN SERPL ELPH-MCNC: 4.7 G/DL
ALP BLD-CCNC: 115 U/L
ALT SERPL-CCNC: 15 U/L
ANION GAP SERPL CALC-SCNC: 15 MMOL/L
AST SERPL-CCNC: 10 U/L
BASOPHILS # BLD AUTO: 0.07 K/UL
BASOPHILS NFR BLD AUTO: 0.7 %
BILIRUB SERPL-MCNC: 0.3 MG/DL
BUN SERPL-MCNC: 28 MG/DL
CALCIUM SERPL-MCNC: 9.5 MG/DL
CHLORIDE SERPL-SCNC: 100 MMOL/L
CHOLEST SERPL-MCNC: 207 MG/DL
CO2 SERPL-SCNC: 22 MMOL/L
CREAT SERPL-MCNC: 0.79 MG/DL
EGFRCR SERPLBLD CKD-EPI 2021: 70 ML/MIN/1.73M2
EOSINOPHIL # BLD AUTO: 0.25 K/UL
EOSINOPHIL NFR BLD AUTO: 2.5 %
FOLATE SERPL-MCNC: >20 NG/ML
GLUCOSE SERPL-MCNC: 88 MG/DL
HCT VFR BLD CALC: 39.1 %
HDLC SERPL-MCNC: 72 MG/DL
HGB BLD-MCNC: 13.1 G/DL
IMM GRANULOCYTES NFR BLD AUTO: 0.4 %
LDLC SERPL-MCNC: 119 MG/DL
LYMPHOCYTES # BLD AUTO: 1.93 K/UL
LYMPHOCYTES NFR BLD AUTO: 19.1 %
MAN DIFF?: NORMAL
MCHC RBC-ENTMCNC: 30.6 PG
MCHC RBC-ENTMCNC: 33.5 G/DL
MCV RBC AUTO: 91.4 FL
MONOCYTES # BLD AUTO: 0.9 K/UL
MONOCYTES NFR BLD AUTO: 8.9 %
NEUTROPHILS # BLD AUTO: 6.9 K/UL
NEUTROPHILS NFR BLD AUTO: 68.4 %
NONHDLC SERPL-MCNC: 135 MG/DL
PLATELET # BLD AUTO: 347 K/UL
POTASSIUM SERPL-SCNC: 5.2 MMOL/L
PROT SERPL-MCNC: 7.2 G/DL
RBC # BLD: 4.28 M/UL
RBC # FLD: 13.1 %
SODIUM SERPL-SCNC: 137 MMOL/L
TRIGL SERPL-MCNC: 89 MG/DL
TSH SERPL-ACNC: 2.81 UIU/ML
VIT B12 SERPL-MCNC: 1824 PG/ML
WBC # FLD AUTO: 10.09 K/UL

## 2025-06-17 ENCOUNTER — APPOINTMENT (OUTPATIENT)
Dept: NEUROLOGY | Facility: CLINIC | Age: 89
End: 2025-06-17

## 2025-07-29 ENCOUNTER — APPOINTMENT (OUTPATIENT)
Dept: GERIATRICS | Facility: CLINIC | Age: 89
End: 2025-07-29

## 2025-09-15 ENCOUNTER — APPOINTMENT (OUTPATIENT)
Dept: GERIATRICS | Facility: CLINIC | Age: 89
End: 2025-09-15
Payer: MEDICARE

## 2025-09-15 ENCOUNTER — APPOINTMENT (OUTPATIENT)
Dept: GERIATRICS | Facility: CLINIC | Age: 89
End: 2025-09-15

## 2025-09-15 VITALS — DIASTOLIC BLOOD PRESSURE: 60 MMHG | SYSTOLIC BLOOD PRESSURE: 122 MMHG

## 2025-09-15 VITALS
HEIGHT: 63 IN | OXYGEN SATURATION: 97 % | TEMPERATURE: 97.8 F | SYSTOLIC BLOOD PRESSURE: 147 MMHG | HEART RATE: 77 BPM | DIASTOLIC BLOOD PRESSURE: 75 MMHG | RESPIRATION RATE: 16 BRPM | WEIGHT: 97.8 LBS | BODY MASS INDEX: 17.33 KG/M2

## 2025-09-15 DIAGNOSIS — R42 DIZZINESS AND GIDDINESS: ICD-10-CM

## 2025-09-15 DIAGNOSIS — G20.C PARKINSONISM, UNSPECIFIED: ICD-10-CM

## 2025-09-15 DIAGNOSIS — I10 ESSENTIAL (PRIMARY) HYPERTENSION: ICD-10-CM

## 2025-09-15 PROCEDURE — 99214 OFFICE O/P EST MOD 30 MIN: CPT
